# Patient Record
Sex: FEMALE | Race: BLACK OR AFRICAN AMERICAN | Employment: FULL TIME | URBAN - METROPOLITAN AREA
[De-identification: names, ages, dates, MRNs, and addresses within clinical notes are randomized per-mention and may not be internally consistent; named-entity substitution may affect disease eponyms.]

---

## 2017-01-03 ENCOUNTER — OFFICE VISIT (OUTPATIENT)
Dept: FAMILY MEDICINE CLINIC | Age: 57
End: 2017-01-03

## 2017-01-03 VITALS
HEIGHT: 64 IN | HEART RATE: 83 BPM | SYSTOLIC BLOOD PRESSURE: 152 MMHG | WEIGHT: 153 LBS | TEMPERATURE: 97.8 F | DIASTOLIC BLOOD PRESSURE: 95 MMHG | BODY MASS INDEX: 26.12 KG/M2 | RESPIRATION RATE: 16 BRPM

## 2017-01-03 DIAGNOSIS — I10 ESSENTIAL HYPERTENSION: ICD-10-CM

## 2017-01-03 DIAGNOSIS — G44.219 EPISODIC TENSION-TYPE HEADACHE, NOT INTRACTABLE: Primary | ICD-10-CM

## 2017-01-03 RX ORDER — MELOXICAM 15 MG/1
TABLET ORAL
Qty: 30 TAB | Refills: 0 | Status: SHIPPED | OUTPATIENT
Start: 2017-01-03 | End: 2017-04-11 | Stop reason: ALTCHOICE

## 2017-01-03 RX ORDER — HYDROCHLOROTHIAZIDE 25 MG/1
25 TABLET ORAL DAILY
Qty: 90 TAB | Refills: 1 | Status: SHIPPED | OUTPATIENT
Start: 2017-01-03 | End: 2017-07-12 | Stop reason: SDUPTHER

## 2017-01-03 NOTE — PROGRESS NOTES
Chief Complaint   Patient presents with    Hypertension     elevated intermediately x two months   Room 6

## 2017-01-03 NOTE — PROGRESS NOTES
Subjective:     Chief Complaint   Patient presents with    Hypertension     elevated intermediately x two months      She  is a 55y.o. year old female who presents for evaluation of:    HA x 2 months - no incr stress. Feels like crown around head. Worse over bilat temples. Not sleeping well. 1 cup coffee daily. No n/v, vision changes, Fhx of migraines. Lasts about 1-2 hours and better with NSAIDs. Hyperlipidemia & HTN  Pt is doing well on current meds with no medication side effects noted. Does not check BP at home. No new myalgias, no joint pains, no weakness  No TIA's, no chest pain on exertion, no dyspnea on exertion, no swelling of ankles.   Exercising - Minimal, walks at work  Dieting - No  Smoking - No     Lab Results   Component Value Date/Time    Cholesterol, total 185 09/07/2016 12:46 PM    HDL Cholesterol 80 09/07/2016 12:46 PM    LDL, calculated 97 09/07/2016 12:46 PM    Triglyceride 40 09/07/2016 12:46 PM     ROS  Gen - no fever/chills  Resp - no dyspnea or cough  CV - no chest pain or MONSIVAIS  Rest per HPI    Objective:     Vitals:    01/03/17 1603   BP: (!) 152/95   Pulse: 83   Resp: 16   Temp: 97.8 °F (36.6 °C)   TempSrc: Oral   Weight: 153 lb (69.4 kg)   Height: 5' 4\" (1.626 m)     Physical Examination:  General appearance - alert, well appearing, and in no distress  Eyes -sclera anicteric  Neck - supple, no significant adenopathy, no thyromegaly  Chest - clear to auscultation, no wheezes, rales or rhonchi, symmetric air entry  Heart - normal rate, regular rhythm, normal S1, S2, no murmurs, rubs, clicks or gallops  Neurological - alert, oriented, normal speech, no focal findings or movement disorder noted    Allergies   Allergen Reactions    Codeine Unknown (comments)     Simply avoids it      Social History     Social History    Marital status:      Spouse name: N/A    Number of children: N/A    Years of education: N/A     Social History Main Topics    Smoking status: Never Smoker    Smokeless tobacco: Never Used    Alcohol use No    Drug use: No    Sexual activity: Yes     Partners: Male     Birth control/ protection: Surgical     Other Topics Concern    None     Social History Narrative      Family History   Problem Relation Age of Onset    Heart Disease Mother     Hypertension Father     Hypertension Sister     Hypertension Brother       Past Surgical History   Procedure Laterality Date    Hx orthopaedic       tendon repair, hand    Endoscopy, colon, diagnostic  7/11     normal    Hx tubal ligation        Past Medical History   Diagnosis Date    DJD (degenerative joint disease) of lumbar spine 1/15/2015    Hypercholesterolemia     Hypertension     Osteoporosis     Prediabetes 9/15/2016    Viral meningitis       Current Outpatient Prescriptions   Medication Sig Dispense Refill    meloxicam (MOBIC) 15 mg tablet Take 1 tab by mouth daily with breakfast as needed for headaches 30 Tab 0    hydroCHLOROthiazide (HYDRODIURIL) 25 mg tablet Take 1 Tab by mouth daily. 90 Tab 1    PREMPRO 0.45-1.5 mg per tablet Take 1 Tab by mouth daily. 30 Tab 12    clobetasol (TEMOVATE) 0.05 % topical cream Apply  to affected area two (2) times a day for 180 days. 30 g 0        Assessment/ Plan:   Cruz Tolentino was seen today for hypertension. Diagnoses and all orders for this visit:    Episodic tension-type headache, not intractable - mild and responding to NSAIDs, work on stress managment  -     meloxicam (MOBIC) 15 mg tablet; Take 1 tab by mouth daily with breakfast as needed for headaches    Essential hypertension - bp too high so incr dose of HCTZ today  -     hydroCHLOROthiazide (HYDRODIURIL) 25 mg tablet; Take 1 Tab by mouth daily. I have discussed the diagnosis with the patient and the intended plan as seen in the above orders. The patient has received an after-visit summary and questions were answered concerning future plans.   I have discussed medication side effects and warnings with the patient as well. Follow-up Disposition:  Return in about 4 weeks (around 1/31/2017) for headaches.

## 2017-01-03 NOTE — MR AVS SNAPSHOT
Visit Information Date & Time Provider Department Dept. Phone Encounter #  
 1/3/2017  4:00 PM Yolette Garcia MD [facility] at [address] [id number] Follow-up Instructions Return in about 4 weeks (around 1/31/2017) for headaches. Your Appointments 2/24/2017  2:15 PM  
ROUTINE CARE with Antony Caban MD  
[facility]) Appt Note: well woman  
 [address] [phone] [address] [place] [address] [address] [address] Upcoming Health Maintenance Date Due  
 BREAST CANCER SCRN MAMMOGRAM 9/8/2018 PAP AKA CERVICAL CYTOLOGY 2/19/2019 COLONOSCOPY 7/22/2021 DTaP/Tdap/Td series (2 - Td) 9/7/2026 Allergies as of 1/3/2017  Review Complete On: 1/3/2017 By: Kei Marquez LPN Severity Noted Reaction Type Reactions Codeine  10/10/2010   Not Verified Unknown (comments) Simply avoids it Current Immunizations  Reviewed on 10/14/2011 Name Date Influenza Vaccine Split 10/5/2011 Not reviewed this visit You Were Diagnosed With   
  
 Codes Comments Episodic tension-type headache, not intractable    -  Primary ICD-10-CM: H41.185 ICD-9-CM: 339.11 Vitals BP Pulse Temp Resp Height(growth percentile) Weight(growth percentile) (!) 152/95 (BP 1 Location: Right arm, BP Patient Position: Sitting) 83 97.8 °F (36.6 °C) (Oral) 16 5' 4\" (1.626 m) 153 lb (69.4 kg) LMP BMI OB Status Smoking Status 11/24/2016 26.26 kg/m2 Postmenopausal Never Smoker Vitals History BMI and BSA Data Body Mass Index Body Surface Area  
 26.26 kg/m 2 1.77 m 2 Preferred Pharmacy Pharmacy Name Phone [facility]oni [facility] [phone] Your Updated Medication List  
  
   
This list is accurate as of: 1/3/17  5:11 PM.  Always use your most recent med list.  
  
  
  
  
 clobetasol 0.05 % topical cream  
Commonly known as:  Beola Jerod Apply  to affected area two (2) times a day for 180 days. hydroCHLOROthiazide 12.5 mg capsule Commonly known as:  Elmyra Glasgow TAKE 1 CAPSULE DAILY  
  
 meloxicam 15 mg tablet Commonly known as:  MOBIC Take 1 tab by mouth daily with breakfast as needed for headaches PREMPRO 0.45-1.5 mg per tablet Generic drug:  estrogen (conjugated)-medroxyPROGESTERone Take 1 Tab by mouth daily. Prescriptions Sent to Pharmacy Refills  
 meloxicam (MOBIC) 15 mg tablet 0 Sig: Take 1 tab by mouth daily with breakfast as needed for headaches Class: Normal  
 Pharmacy: [address], [address] [place] [address] #: [phone] Follow-up Instructions Return in about 4 weeks (around 1/31/2017) for headaches. Patient Instructions Tension Headache: Care Instructions Your Care Instructions Most headaches are tension headaches. These headaches tend to happen again, especially if you are under stress. A tension headache may cause pain or a feeling of pressure all over your head. You probably can't pinpoint the center of the pain. If you keep getting tension headaches, the best thing you can do to limit them is to find out what is causing them and then make changes in those areas. Follow-up care is a key part of your treatment and safety. Be sure to make and go to all appointments, and call your doctor if you are having problems. Its also a good idea to know your test results and keep a list of the medicines you take. How can you care for yourself at home? · Rest in a quiet, dark room with a cool cloth on your forehead until your headache is gone. Close your eyes, and try to relax or go to sleep. Don't watch TV or read. Avoid using the computer. · Use a warm, moist towel or a heating pad set on low to relax tight shoulder and neck muscles. · Have someone gently massage your neck and shoulders. · Take pain medicines exactly as directed. ¨ If the doctor gave you a prescription medicine for pain, take it as prescribed. ¨ If you are not taking a prescription pain medicine, ask your doctor if you can take an over-the-counter medicine. · Be careful not to take pain medicine more often than the instructions allow, because you may get worse or more frequent headaches when the medicine wears off. · If you get another tension headache, stop what you are doing and sit quietly for a moment. Close your eyes and breathe slowly. Try to relax your head and neck muscles. · Do not ignore new symptoms that occur with a headache, such as fever, weakness or numbness, vision changes, or confusion. These may be signs of a more serious problem. To help prevent headaches · Keep a headache diary so you can figure out what triggers your headaches. Avoiding triggers may help you prevent headaches. Record when each headache began, how long it lasted, and what the pain was like (throbbing, aching, stabbing, or dull). List anything that may have triggered the headache, such as being physically or emotionally stressed or being anxious or depressed. Other possible triggers are hunger, anger, fatigue, poor posture, and muscle strain. · Find healthy ways to deal with stress. Headaches are most common during or right after stressful times. Take time to relax before and after you do something that has caused a headache in the past. 
· Exercise daily to relieve stress. Relaxation exercises may help reduce tension. · Get plenty of sleep. · Eat regularly and well. Long periods without food can trigger a headache. · Treat yourself to a massage. Some people find that massages are very helpful in relieving tension. · Try to keep your muscles relaxed by keeping good posture. Check your jaw, face, neck, and shoulder muscles for tension, and try to relax them. When sitting at a desk, change positions often, and stretch for 30 seconds each hour. · Reduce eyestrain from computers by blinking frequently and looking away from the computer screen every so often. Make sure you have proper eyewear and that your monitor is set up properly, about an arms length away. When should you call for help? Call 911 anytime you think you may need emergency care. For example, call if: 
· You have signs of a stroke. These may include: 
¨ Sudden numbness, paralysis, or weakness in your face, arm, or leg, especially on only one side of your body. ¨ Sudden vision changes. ¨ Sudden trouble speaking. ¨ Sudden confusion or trouble understanding simple statements. ¨ Sudden problems with walking or balance. ¨ A sudden, severe headache that is different from past headaches. Call your doctor now or seek immediate medical care if: 
· You have new or worse nausea and vomiting. · You have a new or higher fever. · Your headache gets much worse. Watch closely for changes in your health, and be sure to contact your doctor if: 
· You are not getting better after 2 days (48 hours). Where can you learn more? Go to http://lachelle-bs.info/. Enter [phone] in the search box to learn more about \"Tension Headache: Care Instructions. \" Current as of: February 19, 2016 Content Version: 11.1 © 5455-8625 Healthwise, Incorporated. Care instructions adapted under license by [employer] (which disclaims liability or warranty for this information). If you have questions about a medical condition or this instruction, always ask your healthcare professional. [place] [address] any warranty or liability for your use of this information. Introducing [facility] & HEALTH SERVICES! Dear Red Leiva: Thank you for requesting a [employer] account. Our records indicate that you already have an active [employer] account.   You can access your account anytime at [email or web address] Did you know that you can access your hospital and ER discharge instructions at any time in [employer]? You can also review all of your test results from your hospital stay or ER visit. Additional Information If you have questions, please visit the Frequently Asked Questions section of the [employer] website at [email or web address] Remember, [employer] is NOT to be used for urgent needs. For medical emergencies, dial 911. Now available from your iPhone and Android! Please provide this summary of care documentation to your next provider. Your primary care clinician is listed as Blanco Argueta. If you have any questions after today's visit, please call [phone].

## 2017-01-26 DIAGNOSIS — M85.80 OSTEOPENIA: ICD-10-CM

## 2017-01-26 DIAGNOSIS — N95.1 HOT FLASHES, MENOPAUSAL: ICD-10-CM

## 2017-01-26 DIAGNOSIS — N95.9 MENOPAUSAL AND POSTMENOPAUSAL DISORDER: ICD-10-CM

## 2017-01-27 RX ORDER — CONJUGATED ESTROGENS AND MEDROXYPROGESTERONE ACETATE .45; 1.5 MG/1; MG/1
TABLET, SUGAR COATED ORAL
Qty: 84 TAB | Refills: 2 | Status: SHIPPED | OUTPATIENT
Start: 2017-01-27 | End: 2018-01-29 | Stop reason: SDUPTHER

## 2017-03-24 ENCOUNTER — OFFICE VISIT (OUTPATIENT)
Dept: SURGERY | Age: 57
End: 2017-03-24

## 2017-03-24 VITALS
WEIGHT: 154.4 LBS | TEMPERATURE: 97.4 F | BODY MASS INDEX: 26.36 KG/M2 | OXYGEN SATURATION: 97 % | SYSTOLIC BLOOD PRESSURE: 147 MMHG | HEART RATE: 77 BPM | DIASTOLIC BLOOD PRESSURE: 85 MMHG | HEIGHT: 64 IN | RESPIRATION RATE: 16 BRPM

## 2017-03-24 DIAGNOSIS — N95.0 PMB (POSTMENOPAUSAL BLEEDING): ICD-10-CM

## 2017-03-24 DIAGNOSIS — Z01.419 ENCOUNTER FOR GYNECOLOGICAL EXAMINATION WITHOUT ABNORMAL FINDING: Primary | ICD-10-CM

## 2017-03-24 DIAGNOSIS — M85.80 OSTEOPENIA: ICD-10-CM

## 2017-03-24 DIAGNOSIS — N95.9 MENOPAUSAL AND POSTMENOPAUSAL DISORDER: ICD-10-CM

## 2017-03-24 DIAGNOSIS — G47.20 DISRUPTIONS OF 24 HOUR SLEEP-WAKE CYCLE: ICD-10-CM

## 2017-03-24 DIAGNOSIS — B37.31 MONILIAL VAGINITIS: ICD-10-CM

## 2017-03-24 DIAGNOSIS — I10 ESSENTIAL HYPERTENSION: ICD-10-CM

## 2017-03-24 LAB — WET MOUNT POCT, WMPOCT: ABNORMAL

## 2017-03-24 RX ORDER — FLUCONAZOLE 150 MG/1
TABLET ORAL
Qty: 2 TAB | Refills: 1 | Status: SHIPPED | OUTPATIENT
Start: 2017-03-24 | End: 2017-04-11 | Stop reason: ALTCHOICE

## 2017-03-24 NOTE — MR AVS SNAPSHOT
Visit Information Date & Time Provider Department Dept. Phone Encounter #  
 3/24/2017  1:00 PM Ivan Arias, [address] [facility] [address] [id number] Follow-up Instructions Return in about 1 year (around 3/24/2018), or if symptoms worsen or fail to improve. Your Appointments 4/11/2017 10:10 AM  
ROUTINE CARE with Shala Panchal MD  
[facility] [address]) Appt Note: 6 mos f/u; r/s from 3-20-17  
 [facility] [address] [phone]  
[facility] Upcoming Health Maintenance Date Due  
 BREAST CANCER SCRN MAMMOGRAM 9/8/2018 PAP AKA CERVICAL CYTOLOGY 2/19/2019 COLONOSCOPY 7/22/2021 DTaP/Tdap/Td series (2 - Td) 9/7/2026 Allergies as of 3/24/2017  Review Complete On: 3/24/2017 By: Ivan Arias MD  
  
 Severity Noted Reaction Type Reactions Codeine  10/10/2010   Not Verified Unknown (comments) Simply avoids it Current Immunizations  Reviewed on 10/14/2011 Name Date Influenza Vaccine Split 10/5/2011 Not reviewed this visit You Were Diagnosed With   
  
 Codes Comments Encounter for gynecological examination without abnormal finding    -  Primary ICD-10-CM: C72.810 ICD-9-CM: V72.31   
 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 
ICD-9-CM: 401.9 Monilial vaginitis     ICD-10-CM: B37.3 ICD-9-CM: 112.1 Vitals BP Pulse Temp Resp Height(growth percentile) Weight(growth percentile) 147/85 77 97.4 °F (36.3 °C) (Oral) 16 5' 4\" (1.626 m) 154 lb 6.4 oz (70 kg) LMP SpO2 BMI OB Status Smoking Status 02/25/2017 97% 26.5 kg/m2 Postmenopausal Never Smoker Vitals History BMI and BSA Data Body Mass Index Body Surface Area  
 26.5 kg/m 2 1.78 m 2 Preferred Pharmacy Pharmacy Name Phone [facility] [phone] Your Updated Medication List  
  
   
This list is accurate as of: 3/24/17  2:17 PM.  Always use your most recent med list.  
  
  
  
  
 fluconazole 150 mg tablet Commonly known as:  DIFLUCAN Take 1st pill on day one then 2nd pill on day 3  
  
 hydroCHLOROthiazide 25 mg tablet Commonly known as:  HYDRODIURIL Take 1 Tab by mouth daily. meloxicam 15 mg tablet Commonly known as:  MOBIC Take 1 tab by mouth daily with breakfast as needed for headaches PREMPRO 0.45-1.5 mg per tablet Generic drug:  estrogen (conjugated)-medroxyPROGESTERone TAKE 1 TABLET DAILY Prescriptions Sent to Pharmacy Refills  
 fluconazole (DIFLUCAN) 150 mg tablet 1 Sig: Take 1st pill on day one then 2nd pill on day 3 Class: Normal  
 Pharmacy: Kranthi [facility]ures[facility] Ph #: [phone] We Performed the Following AMB POC SMEAR, STAIN & Charol Diss [facility] [id number] CPT(R)] Follow-up Instructions Return in about 1 year (around 3/24/2018), or if symptoms worsen or fail to improve. Introducing \[facility]\"" & HEALTH SERVICES! Dear Chrissie Jameson: Thank you for requesting a [employer] account. Our records indicate that you already have an active [employer] account. You can access your account anytime at [email or web address] Did you know that you can access your hospital and ER discharge instructions at any time in [employer]? You can also review all of your test results from your hospital stay or ER visit. Additional Information If you have questions, please visit the Frequently Asked Questions section of the [employer] website at [email or web address] Remember, Appetise is NOT to be used for urgent needs. For medical emergencies, dial 911. Now available from your iPhone and Android! Please provide this summary of care documentation to your next provider. Your primary care clinician is listed as Ayah Poll. If you have any questions after today's visit, please call [phone].

## 2017-03-24 NOTE — PROGRESS NOTES
SUBJECTIVE: Shannon Mckeon is a 55 y.o. female [id number] (Abortions 0, Miscarriages 0), who presents with desire for annual well woman exam. Patient's last menstrual period was 2017. Having severe hot flashes. Desires ERT. Allergies   Allergen Reactions    Codeine Unknown (comments)     Simply avoids it      Past Medical History:   Diagnosis Date    DJD (degenerative joint disease) of lumbar spine 1/15/2015    Hypercholesterolemia     Hypertension     Osteoporosis     Prediabetes 9/15/2016    Viral meningitis      Past Surgical History:   Procedure Laterality Date    ENDOSCOPY, COLON, DIAGNOSTIC      normal    HX ORTHOPAEDIC      tendon repair, hand    HX TUBAL LIGATION       OB History      Para Term  AB TAB SAB Ectopic Multiple Living    1 1                Obstetric Comments     Also 1 adopted child        Family History   Problem Relation Age of Onset    Heart Disease Mother     Hypertension Father     Hypertension Sister     Hypertension Brother      Social History     Social History    Marital status:      Spouse name: N/A    Number of children: N/A    Years of education: N/A     Occupational History    Not on file. Social History Main Topics    Smoking status: Never Smoker    Smokeless tobacco: Never Used    Alcohol use No    Drug use: No    Sexual activity: Yes     Partners: Male     Birth control/ protection: Surgical     Other Topics Concern    Not on file     Social History Narrative     Current Outpatient Prescriptions   Medication Sig Dispense Refill    PREMPRO 0.45-1.5 mg per tablet TAKE 1 TABLET DAILY 84 Tab 2    meloxicam (MOBIC) 15 mg tablet Take 1 tab by mouth daily with breakfast as needed for headaches 30 Tab 0    hydroCHLOROthiazide (HYDRODIURIL) 25 mg tablet Take 1 Tab by mouth daily. 90 Tab 1       Review of Systems:   Constitutional: No weight change, chills or fever, anorexia, weakness or sleep disturbance .  Cardiovascular: No chest pain, shortness of breath, or palpitations . Respiratory: No cough, shortness of breath, hemoptysis, or orthopnea . Neurologic: No syncope, headaches or seizures . Hematologic: No easy bruising or unusual bleeding . Psychiatric: No insomnia, confusion, depression, or anxiety . GI:No nausea and vomiting, diarrhea or constipation  . : See HPI . Musculoskeletal: No joint pain or muscle pain . Endocrine: No polydipsia, polyuria, cold intolerance, excessive fatigue, or sleep disturbance . Integumentary: No breast pain, lumps, nipple discharge, or axillary lumps . Objective:     Visit Vitals    /85    Pulse 77    Temp 97.4 °F (36.3 °C) (Oral)    Resp 16    Ht 5' 4\" (1.626 m)    Wt 154 lb 6.4 oz (70 kg)    LMP 02/25/2017    SpO2 97%    BMI 26.5 kg/m2       General:  alert, cooperative, no distress, appears stated age   Skin:  no rash or abnormalities   Eyes: negative   Mouth: MMM no lesions   Lymph Nodes:  Cervical, supraclavicular, and axillary nodes normal.   Breast Exam: normal appearance, no masses or tenderness    Lungs:  clear to auscultation bilaterally   Heart:  regular rate and rhythm   Abdomen: soft, non-tender. Bowel sounds normal. No masses,  no organomegaly   Back:  Costovertebral angle tenderness absent   Genitourinary: Pelvic exam: VULVA: normal appearing vulva with no masses, tenderness or lesions, VAGINA: cottage cheese like discharge, CERVIX: normal appearing cervix without discharge or lesions, UTERUS: uterus is normal size, shape, consistency and nontender, ADNEXA: normal adnexa in size, nontender and no masses. Extremities:  extremities normal, atraumatic, no cyanosis or edema   Neurologic:  sensation grossly intact. Psychiatric:  non focal     ASSESSMENT:      ICD-10-CM ICD-9-CM    1. Encounter for gynecological examination without abnormal finding Z01.419 V72.31    2. PMB (postmenopausal bleeding) N95.0 627.1    3.  Menopausal and postmenopausal disorder N95.9 627.9 4. 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 Osteopenia M85.80 733.90    6. Essential hypertension I10 401.9    7. Monilial vaginitis B37.3 112.1 AMB POC SMEAR, STAIN & INTERPRET, WET MOUNT      fluconazole (DIFLUCAN) 150 mg tablet        Follow-up Disposition:  Return in about 1 year (around 3/24/2018), or if symptoms worsen or fail to improve.

## 2017-04-11 ENCOUNTER — OFFICE VISIT (OUTPATIENT)
Dept: FAMILY MEDICINE CLINIC | Age: 57
End: 2017-04-11

## 2017-04-11 VITALS
RESPIRATION RATE: 16 BRPM | BODY MASS INDEX: 26.12 KG/M2 | SYSTOLIC BLOOD PRESSURE: 128 MMHG | HEIGHT: 64 IN | WEIGHT: 153 LBS | HEART RATE: 66 BPM | DIASTOLIC BLOOD PRESSURE: 77 MMHG | OXYGEN SATURATION: 96 % | TEMPERATURE: 97.1 F

## 2017-04-11 DIAGNOSIS — E78.00 HYPERCHOLESTEROLEMIA: ICD-10-CM

## 2017-04-11 DIAGNOSIS — Z79.899 ENCOUNTER FOR LONG-TERM (CURRENT) USE OF MEDICATIONS: ICD-10-CM

## 2017-04-11 DIAGNOSIS — I10 ESSENTIAL HYPERTENSION: Primary | ICD-10-CM

## 2017-04-11 RX ORDER — IBUPROFEN 200 MG
TABLET ORAL
COMMUNITY
End: 2018-03-30

## 2017-04-11 NOTE — MR AVS SNAPSHOT
Visit Information Date & Time Provider Department Dept. Phone Encounter #  
 4/11/2017 10:10 AM Aimee Carlson MD [facility] at [address] [id number] Follow-up Instructions Return in about 6 months (around 10/11/2017), or if symptoms worsen or fail to improve, for annual exam.  
  
Your Appointments 3/30/2018  1:00 PM  
ROUTINE CARE with Hollis Hill MD  
[facility] [address]) Appt Note: Well Woman $0CP SL 3.24.17  
 305 [facility]. [address] [phone] [address] [place] [address] [address] [address] Upcoming Health Maintenance Date Due  
 BREAST CANCER SCRN MAMMOGRAM 9/8/2018 PAP AKA CERVICAL CYTOLOGY 2/19/2019 COLONOSCOPY 7/22/2021 DTaP/Tdap/Td series (2 - Td) 9/7/2026 Allergies as of 4/11/2017  Review Complete On: 4/11/2017 By: Aimee Carlson MD  
  
 Severity Noted Reaction Type Reactions Codeine  10/10/2010   Not Verified Unknown (comments) Simply avoids it Current Immunizations  Reviewed on 10/14/2011 Name Date Influenza Vaccine Split 10/5/2011 Not reviewed this visit You Were Diagnosed With   
  
 Codes Comments Essential hypertension    -  Primary ICD-10-CM: I10 
ICD-9-CM: 401.9 Hypercholesterolemia     ICD-10-CM: E78.00 ICD-9-CM: 272.0 Encounter for long-term (current) use of medications     ICD-10-CM: Z79.899 ICD-9-CM: V58.69 Vitals BP Pulse Temp Resp Height(growth percentile) Weight(growth percentile) 128/77 (BP 1 Location: Right arm, BP Patient Position: Sitting) 66 97.1 °F (36.2 °C) (Oral) 16 5' 4\" (1.626 m) 153 lb (69.4 kg) LMP SpO2 BMI OB Status Smoking Status 02/25/2017 96% 26.26 kg/m2 Postmenopausal Never Smoker Vitals History BMI and BSA Data Body Mass Index Body Surface Area  
 26.26 kg/m 2 1.77 m 2 Preferred Pharmacy Pharmacy Name Phone [id number] [phone] Your Updated Medication List  
  
   
This list is accurate as of: 4/11/17 11:16 AM.  Always use your most recent med list.  
  
  
  
  
 hydroCHLOROthiazide 25 mg tablet Commonly known as:  HYDRODIURIL Take 1 Tab by mouth daily. ibuprofen 200 mg tablet Commonly known as:  MOTRIN Take  by mouth every six (6) hours as needed for Pain. PREMPRO 0.45-1.5 mg per tablet Generic drug:  estrogen (conjugated)-medroxyPROGESTERone TAKE 1 TABLET DAILY We Performed the Following LIPID PANEL [30064 CPT(R)] METABOLIC PANEL, COMPREHENSIVE [72130 CPT(R)] Follow-up Instructions Return in about 6 months (around 10/11/2017), or if symptoms worsen or fail to improve, for annual exam.  
  
  
Introducing [facility] & [facility] SERVICES! Dear Margi Osullivan: Thank you for requesting a Seclore account. Our records indicate that you already have an active Seclore account. You can access your account anytime at [email or web address] Did you know that you can access your hospital and ER discharge instructions at any time in Seclore? You can also review all of your test results from your hospital stay or ER visit. Additional Information If you have questions, please visit the Frequently Asked Questions section of the Seclore website at [email or web address] Remember, Seclore is NOT to be used for urgent needs. For medical emergencies, dial 911. Now available from your iPhone and Android! Please provide this summary of care documentation to your next provider. Your primary care clinician is listed as Evelia Shore. If you have any questions after today's visit, please call [phone].

## 2017-04-11 NOTE — PROGRESS NOTES
Subjective:     Chief Complaint   Patient presents with    Hypertension     6 month follow-up      She  is a 55y.o. year old female who presents for evaluation of:    Hyperlipidemia & HTN  Pt is doing well on current meds with no medication side effects noted. Does not check BP at home. No new myalgias, no joint pains, no weakness  No TIA's, no chest pain on exertion, no dyspnea on exertion, no swelling of ankles.   Exercising - Minimal, walks at work  Dieting - No  Smoking - No     Lab Results   Component Value Date/Time    Cholesterol, total 185 09/07/2016 12:46 PM    HDL Cholesterol 80 09/07/2016 12:46 PM    LDL, calculated 97 09/07/2016 12:46 PM    Triglyceride 40 09/07/2016 12:46 PM     ROS  Gen - no fever/chills  Resp - no dyspnea or cough  CV - no chest pain or MONSIVAIS   - has some postmenopausal bleeding and seeing Dr. Mily Coyne per HPI    Objective:     Vitals:    04/11/17 1046   BP: 128/77   Pulse: 66   Resp: 16   Temp: 97.1 °F (36.2 °C)   TempSrc: Oral   SpO2: 96%   Weight: 153 lb (69.4 kg)   Height: 5' 4\" (1.626 m)     Physical Examination:  General appearance - alert, well appearing, and in no distress  Eyes -sclera anicteric  Neck - supple, no significant adenopathy, no thyromegaly  Chest - clear to auscultation, no wheezes, rales or rhonchi, symmetric air entry  Heart - normal rate, regular rhythm, normal S1, S2, no murmurs, rubs, clicks or gallops  Neurological - alert, oriented, normal speech, no focal findings or movement disorder noted  Extr - no edema    Allergies   Allergen Reactions    Codeine Unknown (comments)     Simply avoids it      Social History     Social History    Marital status:      Spouse name: N/A    Number of children: N/A    Years of education: N/A     Social History Main Topics    Smoking status: Never Smoker    Smokeless tobacco: Never Used    Alcohol use No    Drug use: No    Sexual activity: Yes     Partners: Male     Birth control/ protection: Surgical Other Topics Concern    None     Social History Narrative      Family History   Problem Relation Age of Onset    Heart Disease Mother     Hypertension Father     Hypertension Sister     Hypertension Brother       Past Surgical History:   Procedure Laterality Date    ENDOSCOPY, COLON, DIAGNOSTIC  7/11    normal    HX ORTHOPAEDIC      tendon repair, hand    HX TUBAL LIGATION        Past Medical History:   Diagnosis Date    DJD (degenerative joint disease) of lumbar spine 1/15/2015    Hypercholesterolemia     Hypertension     Osteoporosis     Prediabetes 9/15/2016    Viral meningitis       Current Outpatient Prescriptions   Medication Sig Dispense Refill    ibuprofen (MOTRIN) 200 mg tablet Take  by mouth every six (6) hours as needed for Pain.  PREMPRO 0.45-1.5 mg per tablet TAKE 1 TABLET DAILY 84 Tab 2    hydroCHLOROthiazide (HYDRODIURIL) 25 mg tablet Take 1 Tab by mouth daily. 90 Tab 1        Assessment/ Plan:   Amanda Melchor was seen today for hypertension. Diagnoses and all orders for this visit:    Essential hypertension - well controlled  -     METABOLIC PANEL, COMPREHENSIVE    Hypercholesterolemia - well controlled  -     METABOLIC PANEL, COMPREHENSIVE  -     LIPID PANEL    Encounter for long-term (current) use of medications  -     METABOLIC PANEL, COMPREHENSIVE  -     LIPID PANEL    I have discussed the diagnosis with the patient and the intended plan as seen in the above orders. The patient has received an after-visit summary and questions were answered concerning future plans. I have discussed medication side effects and warnings with the patient as well.     Follow-up Disposition:  Return in about 6 months (around 10/11/2017), or if symptoms worsen or fail to improve, for annual exam.

## 2017-04-12 LAB
ALBUMIN SERPL-MCNC: 4.2 G/DL (ref 3.5–5.5)
ALBUMIN/GLOB SERPL: 1.4 {RATIO} (ref 1.2–2.2)
ALP SERPL-CCNC: 47 IU/L (ref 39–117)
ALT SERPL-CCNC: 7 IU/L (ref 0–32)
AST SERPL-CCNC: 17 IU/L (ref 0–40)
BILIRUB SERPL-MCNC: 0.6 MG/DL (ref 0–1.2)
BUN SERPL-MCNC: 15 MG/DL (ref 6–24)
BUN/CREAT SERPL: 22 (ref 9–23)
CALCIUM SERPL-MCNC: 9.2 MG/DL (ref 8.7–10.2)
CHLORIDE SERPL-SCNC: 101 MMOL/L (ref 96–106)
CHOLEST SERPL-MCNC: 181 MG/DL (ref 100–199)
CO2 SERPL-SCNC: 25 MMOL/L (ref 18–29)
CREAT SERPL-MCNC: 0.69 MG/DL (ref 0.57–1)
GLOBULIN SER CALC-MCNC: 3.1 G/DL (ref 1.5–4.5)
GLUCOSE SERPL-MCNC: 84 MG/DL (ref 65–99)
HDLC SERPL-MCNC: 76 MG/DL
LDLC SERPL CALC-MCNC: 97 MG/DL (ref 0–99)
POTASSIUM SERPL-SCNC: 3.9 MMOL/L (ref 3.5–5.2)
PROT SERPL-MCNC: 7.3 G/DL (ref 6–8.5)
SODIUM SERPL-SCNC: 145 MMOL/L (ref 134–144)
TRIGL SERPL-MCNC: 40 MG/DL (ref 0–149)
VLDLC SERPL CALC-MCNC: 8 MG/DL (ref 5–40)

## 2017-07-12 DIAGNOSIS — I10 ESSENTIAL HYPERTENSION: ICD-10-CM

## 2017-07-13 RX ORDER — HYDROCHLOROTHIAZIDE 25 MG/1
TABLET ORAL
Qty: 90 TAB | Refills: 0 | Status: SHIPPED | OUTPATIENT
Start: 2017-07-13 | End: 2017-10-12 | Stop reason: SDUPTHER

## 2017-08-09 ENCOUNTER — TELEPHONE (OUTPATIENT)
Dept: SURGERY | Age: 57
End: 2017-08-09

## 2017-08-09 NOTE — TELEPHONE ENCOUNTER
Called pt and she is having some PMB. Bleeding twice per month. Told to call office and make appt ASAP.

## 2017-08-29 ENCOUNTER — OFFICE VISIT (OUTPATIENT)
Dept: SURGERY | Age: 57
End: 2017-08-29

## 2017-08-29 VITALS
OXYGEN SATURATION: 97 % | BODY MASS INDEX: 26.67 KG/M2 | TEMPERATURE: 97.3 F | HEIGHT: 64 IN | DIASTOLIC BLOOD PRESSURE: 93 MMHG | RESPIRATION RATE: 18 BRPM | SYSTOLIC BLOOD PRESSURE: 136 MMHG | HEART RATE: 86 BPM | WEIGHT: 156.2 LBS

## 2017-08-29 DIAGNOSIS — Z92.89 HISTORY OF ENDOMETRIAL BIOPSY: ICD-10-CM

## 2017-08-29 DIAGNOSIS — N95.0 POSTMENOPAUSAL BLEEDING: Primary | ICD-10-CM

## 2017-08-29 DIAGNOSIS — N95.9 MENOPAUSAL AND POSTMENOPAUSAL DISORDER: ICD-10-CM

## 2017-08-29 NOTE — MR AVS SNAPSHOT
Visit Information Date & Time Provider Department Dept. Phone Encounter #  
 8/29/2017  2:45 PM Eduardo Hernández, [address] [facility] [phone] [id number] Follow-up Instructions Return in about 2 weeks (around 9/12/2017), or if symptoms worsen or fail to improve. Your Appointments 10/12/2017  8:30 AM  
ROUTINE CARE with Filomena Olguin MD  
[facility]) Appt Note: 6m fu  
 [facility] [address] [address]  
[facility]  
  
    
 3/30/2018  1:00 PM  
ROUTINE CARE with Eduardo Hernández MD  
[facility]) Appt Note: Well Woman $0CP SL 3.24.17  
 305 [facility]. [address] [address] [address] [facility] [address] Upcoming Health Maintenance Date Due INFLUENZA AGE 9 TO ADULT 8/1/2017 BREAST CANCER SCRN MAMMOGRAM 9/8/2018 PAP AKA CERVICAL CYTOLOGY 2/19/2019 COLONOSCOPY 7/22/2021 DTaP/Tdap/Td series (2 - Td) 9/7/2026 Allergies as of 8/29/2017  Review Complete On: 8/29/2017 By: Eduardo Hernández MD  
  
 Severity Noted Reaction Type Reactions Codeine  10/10/2010   Not Verified Unknown (comments) Simply avoids it Current Immunizations  Reviewed on 10/14/2011 Name Date Influenza Vaccine Split 10/5/2011 Not reviewed this visit You Were Diagnosed With   
  
 Codes Comments Postmenopausal bleeding    -  Primary ICD-10-CM: N95.0 ICD-9-CM: 627.1 Menopausal and postmenopausal disorder     ICD-10-CM: N95.9 ICD-9-CM: 627.9 History of endometrial biopsy     ICD-10-CM: Z78.9 ICD-9-CM: V49.89 Vitals BP Pulse Temp Resp Height(growth percentile) Weight(growth percentile)  (!) 136/93 (BP 1 Location: Right arm, BP Patient Position: Sitting) 86 97.3 °F (36.3 °C) (Temporal) 18 5' 4\" (1.626 m) 156 lb 3.2 oz (70.9 kg) LMP SpO2 BMI OB Status Smoking Status 08/08/2017 (Approximate) 97% 26.81 kg/m2 Postmenopausal Never Smoker BMI and BSA Data Body Mass Index Body Surface Area  
 26.81 kg/m 2 1.79 m 2 Preferred Pharmacy Pharmacy Name Phone [address] [facility] [phone] Your Updated Medication List  
  
   
This list is accurate as of: 8/29/17  3:21 PM.  Always use your most recent med list.  
  
  
  
  
 hydroCHLOROthiazide 25 mg tablet Commonly known as:  HYDRODIURIL  
TAKE 1 TABLET DAILY  
  
 ibuprofen 200 mg tablet Commonly known as:  MOTRIN Take  by mouth every six (6) hours as needed for Pain. PREMPRO 0.45-1.5 mg per tablet Generic drug:  estrogen (conjugated)-medroxyPROGESTERone TAKE 1 TABLET DAILY Follow-up Instructions Return in about 2 weeks (around 9/12/2017), or if symptoms worsen or fail to improve. To-Do List   
 08/29/2017 Imaging:  US PELV NON OBS   
  
 08/29/2017 Imaging:  US TRANSVAGINAL   
  
 09/20/2017 1:40 PM  
  Appointment with [address] ROSINA 1 at [facility] ([phone]) Shower or bathe using soap and water. Do not use deodorant, powder, perfumes, or lotion the day of your exam.  If your prior mammograms were not performed at [facility] 6 please bring films with you or forward prior images 2 days before your procedure. Check in at registration 15min before your appointment time unless you were instructed to do otherwise. A script is not necessary, but if you have one, please bring it on the day of the mammogram or have it faxed to the department. [facility] [phone] [facility]  [phone] [facility] [address] [place]  [phone] [phone] [facility] 115[address] [facility] [phone] [facility] & [facility] SERVICES! Dear Kristine Manus: Thank you for requesting a [employer] account. Our records indicate that you already have an active [employer] account. You can access your account anytime at [email or web address] Did you know that you can access your hospital and ER discharge instructions at any time in [employer]? You can also review all of your test results from your hospital stay or ER visit. Additional Information If you have questions, please visit the Frequently Asked Questions section of the [employer] website at [email or web address] Remember, Lif[employer] is NOT to be used for urgent needs. For medical emergencies, dial 911. Now available from your iPhone and Android! Please provide this summary of care documentation to your next provider. Your primary care clinician is listed as Arvind Zaman. If you have any questions after today's visit, please call [phone].

## 2017-08-29 NOTE — PROGRESS NOTES
SUBJECTIVE: Stanley Bates is a 65 y.o. female who presents with multiple cycles 7/22, 8/8: heavy bleeding, both lasted around 7 days, no pain or cramping. Severity is described as moderate. Patient's last menstrual period was 08/08/2017 (approximate). On prempro. 12/02/2017  FINAL PATHOLOGIC DIAGNOSIS   Endometrial lining, biopsy:   Fragments of benign inactive endometrium     Diagnosis:   Part A: CERVIX, POLYP:   BENIGN ENDOCERVICAL POLYP WITH ACUTE INFLAMMATION AND SQUAMOUS   METAPLASIA. Part B: ENDOMETRIUM, BIOPSY:   FRAGMENTS OF PROLIFERATIVE ENDOMETRIUM WITH BREAKDOWN CHANGES,   CONSISTENT WITH ANOVULATORY CYCLE. NO HYPERPLASIA OR CARCINOMA. SOL/04/26/2016     Allergies   Allergen Reactions    Codeine Unknown (comments)     Simply avoids it       ROS: Constitutional: No weight change, chills or fever, anorexia, weakness or sleep disturbance . Cardiovascular: No chest pain, shortness of breath, or palpitations . Respiratory: No cough, shortness of breath, hemoptysis, or orthopnea . Neurologic: No syncope, headaches or seizures . Hematologic: No easy bruising or unusual bleeding . Psychiatric: No insomnia, confusion, depression, or anxiety . GI:No nausea and vomiting, diarrhea or constipation  . : See HPI . Musculoskeletal: No joint pain or muscle pain . Endocrine: No polydipsia, polyuria, cold intolerance, excessive fatigue, or sleep disturbance . Integumentary: No breast pain, lumps, nipple discharge, or axillary lumps . OBJECTIVE:     Visit Vitals    BP (!) 136/93 (BP 1 Location: Right arm, BP Patient Position: Sitting)    Pulse 86    Temp 97.3 °F (36.3 °C) (Temporal)    Resp 18    Ht 5' 4\" (1.626 m)    Wt 156 lb 3.2 oz (70.9 kg)    LMP 08/08/2017 (Approximate)    SpO2 97%    BMI 26.81 kg/m2       General:  alert, cooperative, no distress, appears stated age   Abdomen: soft, non-tender.  Bowel sounds normal. No masses,  no organomegaly   Back:  Costovertebral angle tenderness absent   Genitourinary: Pelvic exam: VULVA: normal appearing vulva with no masses, tenderness or lesions, VAGINA: normal appearing vagina with normal color and discharge, no lesions, CERVIX: normal appearing cervix without discharge or lesions, UTERUS: uterus is normal size, shape, consistency and nontender, ADNEXA: normal adnexa in size, nontender and no masses. Extremities:  extremities normal, atraumatic, no cyanosis or edema     ASSESSMENT:      ICD-10-CM ICD-9-CM    1. Postmenopausal bleeding N95.0 627.1 US PELV NON OBS      US TRANSVAGINAL   2. Menopausal and postmenopausal disorder N95.9 627.9    3. 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  PREMPRO 0.45-1.5 mg per tablet TAKE 1 TABLET DAILY 84 Tab 2        Follow-up Disposition:  Return in about 2 weeks (around 9/12/2017), or if symptoms worsen or fail to improve.

## 2017-08-29 NOTE — PROGRESS NOTES
Yesy Espinoza is a 65 y.o. female   Chief Complaint   Patient presents with    Vaginal Bleeding     multiple cycles 7/22, 8/8: heavy bleeding, both lasted around 7 days, no pain or cramping     1. Have you been to an emergency room, urgent clinic, or hospitalized since your last visit? NO  If yes, where when, and reason for visit? 2. Have seen or consulted any other health care provider since your last visit? NO  Please include any pap smears or colon screening in this section  If yes, where when, and reason for visit? 6. Do you have an Advanced Directive/ Living Will in place?  NO  If yes, do we have a copy on file NO  If no, would you like information NO

## 2017-09-05 ENCOUNTER — HOSPITAL ENCOUNTER (OUTPATIENT)
Dept: ULTRASOUND IMAGING | Age: 57
Discharge: HOME | End: 2017-09-05
Attending: OBSTETRICS & GYNECOLOGY
Payer: COMMERCIAL

## 2017-09-05 DIAGNOSIS — N95.0 POSTMENOPAUSAL BLEEDING: ICD-10-CM

## 2017-09-05 PROCEDURE — 76830 TRANSVAGINAL US NON-OB: CPT

## 2017-09-05 PROCEDURE — 76856 US EXAM PELVIC COMPLETE: CPT

## 2017-09-15 ENCOUNTER — OFFICE VISIT (OUTPATIENT)
Dept: SURGERY | Age: 57
End: 2017-09-15

## 2017-09-15 VITALS
WEIGHT: 155.6 LBS | TEMPERATURE: 97 F | OXYGEN SATURATION: 96 % | DIASTOLIC BLOOD PRESSURE: 82 MMHG | HEIGHT: 64 IN | BODY MASS INDEX: 26.56 KG/M2 | HEART RATE: 85 BPM | SYSTOLIC BLOOD PRESSURE: 127 MMHG

## 2017-09-15 DIAGNOSIS — N95.9 MENOPAUSAL AND POSTMENOPAUSAL DISORDER: ICD-10-CM

## 2017-09-15 DIAGNOSIS — Z92.89 HISTORY OF ENDOMETRIAL BIOPSY: ICD-10-CM

## 2017-09-15 DIAGNOSIS — D25.1 INTRAMURAL LEIOMYOMA OF UTERUS: Primary | ICD-10-CM

## 2017-09-15 NOTE — MR AVS SNAPSHOT
Visit Information Date & Time Provider Department Dept. Phone Encounter #  
 9/15/2017  2:45 PM Eduardo Hernández, [address] [facility] [address] [id number] Follow-up Instructions Return in about 6 months (around 3/15/2018), or if symptoms worsen or fail to improve. Your Appointments 10/12/2017  8:30 AM  
ROUTINE CARE with Filomena Olguin MD  
[facility]) Appt Note: 6m fu  
 [facility] [address] [place] [address]  
[facility]  
  
    
 3/30/2018  1:00 PM  
ROUTINE CARE with Eduardo Hernández MD  
[facility] [place] [facility]) Appt Note: Well Woman $0CP SL 3.24.17  
 [address] [facility]. [address] [place] [phone] [address] [place] [address] [address] [place] [address] Upcoming Health Maintenance Date Due INFLUENZA AGE 9 TO ADULT 8/1/2017 BREAST CANCER SCRN MAMMOGRAM 9/8/2018 PAP AKA CERVICAL CYTOLOGY 2/19/2019 COLONOSCOPY 7/22/2021 DTaP/Tdap/Td series (2 - Td) 9/7/2026 Allergies as of 9/15/2017  Review Complete On: 9/15/2017 By: Eduardo Hernández MD  
  
 Severity Noted Reaction Type Reactions Codeine  10/10/2010   Not Verified Unknown (comments) Simply avoids it Current Immunizations  Reviewed on 10/14/2011 Name Date Influenza Vaccine Split 10/5/2011 Not reviewed this visit You Were Diagnosed With   
  
 Codes Comments Intramural leiomyoma of uterus    -  Primary ICD-10-CM: D25.1 ICD-9-CM: 218.1 History of endometrial biopsy     ICD-10-CM: Z78.9 ICD-9-CM: V49.89 Menopausal and postmenopausal disorder     ICD-10-CM: N95.9 ICD-9-CM: 627.9 Vitals BP Pulse Temp Height(growth percentile) Weight(growth percentile) LMP  
 127/82 85 97 °F (36.1 °C) (Oral) 5' 4\" (1.626 m) 155 lb 9.6 oz (70.6 kg) 08/08/2017 (Approximate) SpO2 BMI OB Status Smoking Status 96% 26.71 kg/m2 Postmenopausal Never Smoker Vitals History BMI and BSA Data Body Mass Index Body Surface Area  
 26.71 kg/m 2 1.79 m 2 Preferred Pharmacy Pharmacy Name Phone [phone] [phone] Your Updated Medication List  
  
   
This list is accurate as of: 9/15/17  3:05 PM.  Always use your most recent med list.  
  
  
  
  
 hydroCHLOROthiazide 25 mg tablet Commonly known as:  HYDRODIURIL  
TAKE 1 TABLET DAILY  
  
 ibuprofen 200 mg tablet Commonly known as:  MOTRIN Take  by mouth every six (6) hours as needed for Pain. PREMPRO 0.45-1.5 mg per tablet Generic drug:  estrogen (conjugated)-medroxyPROGESTERone TAKE 1 TABLET DAILY Follow-up Instructions Return in about 6 months (around 3/15/2018), or if symptoms worsen or fail to improve. To-Do List   
 09/20/2017 1:40 PM  
  Appointment with [facility] ROSINA 1 at [facility] ([phone]) Shower or bathe using soap and water. Do not use deodorant, powder, perfumes, or lotion the day of your exam.  If your prior mammograms were not performed at [facility] 6 please bring films with you or forward prior images 2 days before your procedure. Check in at registration 15min before your appointment time unless you were instructed to do otherwise. A script is not necessary, but if you have one, please bring it on the day of the mammogram or have it faxed to the department. [facility] [phone] [facility] [address]  [phone] [facility] [address] [place]  [phone] [facility] [phone] [facility] [address] [facility] [phone] Introducing [address]! Dear Citlalli Mendez: Thank you for requesting a [employer] account. Our records indicate that you already have an active [employer] account. You can access your account anytime at [email or web address] Did you know that you can access your hospital and ER discharge instructions at any time in [employer]? You can also review all of your test results from your hospital stay or ER visit. Additional Information If you have questions, please visit the Frequently Asked Questions section of the [employer] website at [email or web address] Remember, [employer] is NOT to be used for urgent needs. For medical emergencies, dial 911. Now available from your iPhone and Android! Please provide this summary of care documentation to your next provider. Your primary care clinician is listed as Los Walker. If you have any questions after today's visit, please call [phone].

## 2017-09-15 NOTE — PROGRESS NOTES
SUBJECTIVE: Magda Das is a 65 y.o. female who presents with multiple cycles 7/22, 8/8: heavy bleeding, both lasted around 7 days, no pain or cramping. Severity is described as moderate. Patient's last menstrual period was 08/08/2017 (approximate). Pt states that she has not had anymore vaginal bleeding since 08/08/2017. No pain. On prempro. 12/02/2017  FINAL PATHOLOGIC DIAGNOSIS   Endometrial lining, biopsy:   Fragments of benign inactive endometrium       04/26/2016   Diagnosis:   Part A: CERVIX, POLYP:   BENIGN ENDOCERVICAL POLYP WITH ACUTE INFLAMMATION AND SQUAMOUS   METAPLASIA. Part B: ENDOMETRIUM, BIOPSY:   FRAGMENTS OF PROLIFERATIVE ENDOMETRIUM WITH BREAKDOWN CHANGES,   CONSISTENT WITH ANOVULATORY CYCLE. NO HYPERPLASIA OR CARCINOMA. SOL/04/26/2016     Allergies   Allergen Reactions    Codeine Unknown (comments)     Simply avoids it       ROS: Constitutional: No weight change, chills or fever, anorexia, weakness or sleep disturbance . Cardiovascular: No chest pain, shortness of breath, or palpitations . Respiratory: No cough, shortness of breath, hemoptysis, or orthopnea . Neurologic: No syncope, headaches or seizures . Hematologic: No easy bruising or unusual bleeding . Psychiatric: No insomnia, confusion, depression, or anxiety . GI:No nausea and vomiting, diarrhea or constipation  . : See HPI . Musculoskeletal: No joint pain or muscle pain . Endocrine: No polydipsia, polyuria, cold intolerance, excessive fatigue, or sleep disturbance . Integumentary: No breast pain, lumps, nipple discharge, or axillary lumps . OBJECTIVE:     Visit Vitals    /82    Pulse 85    Temp 97 °F (36.1 °C) (Oral)    Ht 5' 4\" (1.626 m)    Wt 155 lb 9.6 oz (70.6 kg)    LMP 08/08/2017 (Approximate)    SpO2 96%    BMI 26.71 kg/m2       General:  alert, cooperative, no distress, appears stated age   Abdomen: soft, non-tender.  Bowel sounds normal. No masses,  no organomegaly   Back: Costovertebral angle tenderness absent   Genitourinary: Pelvic exam: VULVA: normal appearing vulva with no masses, tenderness or lesions, VAGINA: normal appearing vagina with normal color and discharge, no lesions, CERVIX: normal appearing cervix without discharge or lesions, UTERUS: uterus is normal size, shape, consistency and nontender, ADNEXA: normal adnexa in size, nontender and no masses. Extremities:  extremities normal, atraumatic, no cyanosis or edema       Final result (Exam End: 9/5/2017  3:47 PM) Reviewed    Study Result   Clinical indication: Postmenopausal bleeding.     Transabdominal and transvaginal pelvic sonography performed.     The uterus measured 8 x 4 x 4.6 cm. Several fibroids are demonstrated. One is  pedunculated on the left and measure 4.3 x 3.4 cm, one is subserosal on the  right and measure 4.7 x 4.3 x 3.6 cm. The stripe is 5 mm, the cervix appears  normal. There is no adnexal mass or free fluid. The ovaries are not visualized  due to body habitus and gas.     IMPRESSION   impression: Uterine fibroids. ASSESSMENT:      ICD-10-CM ICD-9-CM    1. Intramural leiomyoma of uterus D25.1 218.1    2. History of endometrial biopsy Z78.9 V49.89    3.  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  PREMPRO 0.45-1.5 mg per tablet TAKE 1 TABLET DAILY 84 Tab 2     Will repeat pelvic US in 6 months for f/u. She does not want surgery at this time. Follow-up Disposition:  Return in about 6 months (around 3/15/2018), or if symptoms worsen or fail to improve.

## 2017-10-10 ENCOUNTER — HOSPITAL ENCOUNTER (OUTPATIENT)
Dept: MAMMOGRAPHY | Age: 57
Discharge: HOME | End: 2017-10-10
Attending: OBSTETRICS & GYNECOLOGY
Payer: COMMERCIAL

## 2017-10-10 DIAGNOSIS — Z12.31 VISIT FOR SCREENING MAMMOGRAM: ICD-10-CM

## 2017-10-10 PROCEDURE — 77067 SCR MAMMO BI INCL CAD: CPT

## 2017-10-12 ENCOUNTER — OFFICE VISIT (OUTPATIENT)
Dept: FAMILY MEDICINE CLINIC | Age: 57
End: 2017-10-12

## 2017-10-12 VITALS
DIASTOLIC BLOOD PRESSURE: 76 MMHG | SYSTOLIC BLOOD PRESSURE: 138 MMHG | HEART RATE: 72 BPM | WEIGHT: 156 LBS | OXYGEN SATURATION: 98 % | BODY MASS INDEX: 26.63 KG/M2 | TEMPERATURE: 97.2 F | RESPIRATION RATE: 16 BRPM | HEIGHT: 64 IN

## 2017-10-12 DIAGNOSIS — E78.00 HYPERCHOLESTEROLEMIA: ICD-10-CM

## 2017-10-12 DIAGNOSIS — M85.80 OSTEOPENIA, UNSPECIFIED LOCATION: ICD-10-CM

## 2017-10-12 DIAGNOSIS — Z00.00 WELL ADULT EXAM: Primary | ICD-10-CM

## 2017-10-12 DIAGNOSIS — I10 ESSENTIAL HYPERTENSION: ICD-10-CM

## 2017-10-12 DIAGNOSIS — E55.9 VITAMIN D DEFICIENCY: ICD-10-CM

## 2017-10-12 DIAGNOSIS — R73.03 PREDIABETES: ICD-10-CM

## 2017-10-12 LAB
BILIRUB UR QL STRIP: NEGATIVE
GLUCOSE UR-MCNC: NEGATIVE MG/DL
KETONES P FAST UR STRIP-MCNC: NEGATIVE MG/DL
PH UR STRIP: 7.5 [PH] (ref 4.6–8)
PROT UR QL STRIP: NEGATIVE MG/DL
SP GR UR STRIP: 1.01 (ref 1–1.03)
UA UROBILINOGEN AMB POC: NORMAL (ref 0.2–1)
URINALYSIS CLARITY POC: CLEAR
URINALYSIS COLOR POC: YELLOW
URINE BLOOD POC: NEGATIVE
URINE LEUKOCYTES POC: NEGATIVE
URINE NITRITES POC: NEGATIVE

## 2017-10-12 RX ORDER — HYDROCHLOROTHIAZIDE 25 MG/1
TABLET ORAL
Qty: 90 TAB | Refills: 1 | Status: SHIPPED | OUTPATIENT
Start: 2017-10-12 | End: 2018-04-23 | Stop reason: SDUPTHER

## 2017-10-12 NOTE — PROGRESS NOTES
Subjective:     Chief Complaint   Patient presents with    Hypertension     6 month follow-up      She  is a 65y.o. year old female who presents for evaluation of: CPE  UTD on colonoscopy and paps. mammo scheduled tomorrow. Seeing Dr. Damien Mishra for OB/GYN. Hyperlipidemia & HTN  Pt is doing well on current meds with no medication side effects noted. Does not check BP at home. No new myalgias, no joint pains, no weakness  No TIA's, no chest pain on exertion, no dyspnea on exertion, no swelling of ankles.   Exercising - Minimal, walks at work  Dieting - No  Smoking - No     Lab Results   Component Value Date/Time    Cholesterol, total 181 04/11/2017 11:29 AM    HDL Cholesterol 76 04/11/2017 11:29 AM    LDL, calculated 97 04/11/2017 11:29 AM    Triglyceride 40 04/11/2017 11:29 AM     ROS:  Constitutional: negative except for fevers, chills and fatigue  Eyes: negative for visual disturbance  Ears, nose, mouth, throat, and face: negative for hearing loss and sore throat  Respiratory: negative for cough or dyspnea on exertion  Cardiovascular: negative for chest pain, dyspnea, palpitations, fatigue  Gastrointestinal: negative for nausea, vomiting, change in bowel habits, diarrhea and abdominal pain  Genitourinary:negative for frequency and dysuria  Skinno rashes  Hematologic/lymphatic: negative for easy bruising and bleeding  Musculoskeletal:negative for myalgias and muscle weakness  Neurological: negative for headaches and dizziness  Behavioral/Psych: negative for anxiety and depression    Objective:     Vitals:    10/12/17 0849   BP: 138/76   Pulse: 72   Resp: 16   Temp: 97.2 °F (36.2 °C)   TempSrc: Oral   SpO2: 98%   Weight: 156 lb (70.8 kg)   Height: 5' 4\" (1.626 m)     Physical Examination:  General appearance - alert, well appearing, and in no distress  Eyes - sclera anicteric, extraocular eye movements intact  Nose - normal and patent, no erythema, discharge or polyps  Mouth - mucous membranes moist, pharynx normal without lesions  Neck - supple, no significant adenopathy  Lymphatics - no palpable lymphadenopathy, no hepatosplenomegaly  Chest - clear to auscultation, no wheezes, rales or rhonchi, symmetric air entry  Heart - normal rate, regular rhythm, normal S1, S2, no murmurs, rubs, clicks or gallops  Abdomen - soft, nontender, nondistended, no masses or organomegaly  Breasts & Pelvic - exam declined by the patient as done by OB/GYN  Neurological - alert, oriented, normal speech, no focal findings or movement disorder noted  Musculoskeletal - no joint tenderness, deformity or swelling  Extremities -no edema    Allergies   Allergen Reactions    Codeine Unknown (comments)     Simply avoids it      Social History     Social History    Marital status:      Spouse name: N/A    Number of children: N/A    Years of education: N/A     Social History Main Topics    Smoking status: Never Smoker    Smokeless tobacco: Never Used    Alcohol use No    Drug use: No    Sexual activity: Yes     Partners: Male     Birth control/ protection: Surgical     Other Topics Concern    None     Social History Narrative      Family History   Problem Relation Age of Onset    Heart Disease Mother     Hypertension Father     Hypertension Sister     Hypertension Brother       Past Surgical History:   Procedure Laterality Date    ENDOSCOPY, COLON, DIAGNOSTIC  7/11    normal    HX ORTHOPAEDIC      tendon repair, hand    HX TUBAL LIGATION        Past Medical History:   Diagnosis Date    DJD (degenerative joint disease) of lumbar spine 1/15/2015    Hypercholesterolemia     Hypertension     Osteoporosis     Prediabetes 9/15/2016    Viral meningitis       Current Outpatient Prescriptions   Medication Sig Dispense Refill    hydroCHLOROthiazide (HYDRODIURIL) 25 mg tablet TAKE 1 TABLET DAILY 90 Tab 1    ibuprofen (MOTRIN) 200 mg tablet Take  by mouth every six (6) hours as needed for Pain.       PREMPRO 0.45-1.5 mg per tablet TAKE 1 TABLET DAILY 84 Tab 2        Assessment/ Plan:   Diagnoses and all orders for this visit:    1. Well adult exam  -     CBC W/O DIFF  -     LIPID PANEL  -     HEMOGLOBIN A1C WITH EAG  -     METABOLIC PANEL, COMPREHENSIVE  -     TSH 3RD GENERATION  -     AMB POC URINALYSIS DIP STICK AUTO W/O MICRO  -     VITAMIN D, 25 HYDROXY    2. Essential hypertensionwell controlled  -     hydroCHLOROthiazide (HYDRODIURIL) 25 mg tablet; TAKE 1 TABLET DAILY  -     METABOLIC PANEL, COMPREHENSIVE  -     AMB POC URINALYSIS DIP STICK AUTO W/O MICRO    3. Hypercholesterolemiawell controlled  -     LIPID PANEL  -     HEMOGLOBIN A1C WITH EAG  -     METABOLIC PANEL, COMPREHENSIVE  -     TSH 3RD GENERATION    4. Prediabetesstable  -     HEMOGLOBIN A1C WITH EAG    5. Vitamin D deficiency  -     VITAMIN D, 25 HYDROXY    6. Osteopenia, unspecified location    I have discussed the diagnosis with the patient and the intended plan as seen in the above orders. The patient has received an after-visit summary and questions were answered concerning future plans. I have discussed medication side effects and warnings with the patient as well. Follow-up Disposition:  Return in about 6 months (around 4/12/2018), or if symptoms worsen or fail to improve.

## 2017-10-12 NOTE — MR AVS SNAPSHOT
Visit Information Date & Time Provider Department Dept. Phone Encounter #  
 10/12/2017  8:30 AM Preeti Beverly MD [facility] at [address] [id number] Follow-up Instructions Return in about 6 months (around 4/12/2018), or if symptoms worsen or fail to improve. Your Appointments 3/30/2018  1:00 PM  
ROUTINE CARE with Arleen Carlson MD  
[facility] [address]) Appt Note: Well Woman $0CP SL 3.24.17  
 [address] [facility]. [address] [id number] [address] [place] [address] [address] [address] Upcoming Health Maintenance Date Due  
 PAP AKA CERVICAL CYTOLOGY 2/19/2019 BREAST CANCER SCRN MAMMOGRAM 10/10/2019 COLONOSCOPY 7/22/2021 DTaP/Tdap/Td series (2 - Td) 9/7/2026 Allergies as of 10/12/2017  Review Complete On: 10/12/2017 By: Preeti Beverly MD  
  
 Severity Noted Reaction Type Reactions Codeine  10/10/2010   Not Verified Unknown (comments) Simply avoids it Current Immunizations  Reviewed on 10/14/2011 Name Date Influenza Vaccine Split 10/5/2011 Not reviewed this visit You Were Diagnosed With   
  
 Codes Comments Well adult exam    -  Primary ICD-10-CM: Z00.00 ICD-9-CM: V70.0 Essential hypertension     ICD-10-CM: I10 
ICD-9-CM: 401.9 Hypercholesterolemia     ICD-10-CM: E78.00 ICD-9-CM: 272.0 Prediabetes     ICD-10-CM: R73.03 
ICD-9-CM: 790.29 Vitamin D deficiency     ICD-10-CM: E55.9 ICD-9-CM: 268.9 Osteopenia, unspecified location     ICD-10-CM: M85.80 ICD-9-CM: 733.90 Vitals BP Pulse Temp Resp Height(growth percentile) Weight(growth percentile) 138/76 (BP 1 Location: Right arm, BP Patient Position: Sitting) 72 97.2 °F (36.2 °C) (Oral) 16 5' 4\" (1.626 m) 156 lb (70.8 kg) LMP SpO2 BMI OB Status Smoking Status 08/08/2017 (Approximate) 98% 26.78 kg/m2 Postmenopausal Never Smoker Vitals History BMI and BSA Data Body Mass Index Body Surface Area  
 26.78 kg/m 2 1.79 m 2 Preferred Pharmacy Pharmacy Name Phone [address] [facility] [phone] Your Updated Medication List  
  
   
This list is accurate as of: 10/12/17  9:03 AM.  Always use your most recent med list.  
  
  
  
  
 hydroCHLOROthiazide 25 mg tablet Commonly known as:  HYDRODIURIL  
TAKE 1 TABLET DAILY  
  
 ibuprofen 200 mg tablet Commonly known as:  MOTRIN Take  by mouth every six (6) hours as needed for Pain. PREMPRO 0.45-1.5 mg per tablet Generic drug:  estrogen (conjugated)-medroxyPROGESTERone TAKE 1 TABLET DAILY Prescriptions Sent to Pharmacy Refills  
 hydroCHLOROthiazide (HYDRODIURIL) 25 mg tablet 1 Sig: TAKE 1 TABLET DAILY Class: Normal  
 Pharmacy: [address], [address] [place] [address] #: [phone] We Performed the Following AMB POC URINALYSIS DIP STICK AUTO W/O MICRO [79733 CPT(R)] CBC W/O DIFF [34484 CPT(R)] HEMOGLOBIN A1C WITH EAG [71469 CPT(R)] LIPID PANEL [90394 CPT(R)] METABOLIC PANEL, COMPREHENSIVE [45271 CPT(R)] TSH 3RD GENERATION [57122 CPT(R)] VITAMIN D, 25 HYDROXY E709642 CPT(R)] Follow-up Instructions Return in about 6 months (around 4/12/2018), or if symptoms worsen or fail to improve. Introducing [facility] SERVICES! Dear Rad Matias: Thank you for requesting a [employer] account. Our records indicate that you already have an active Building[employer] account. You can access your account anytime at [email or web address] Did you know that you can access your hospital and ER discharge instructions at any time in Building[employer]? You can also review all of your test results from your hospital stay or ER visit. Additional Information If you have questions, please visit the Frequently Asked Questions section of the [employer] website at [email or web address] Remember, [email or web address] is NOT to be used for urgent needs. For medical emergencies, dial 911. Now available from your iPhone and Android! Please provide this summary of care documentation to your next provider. Your primary care clinician is listed as Mitzi Owusu. If you have any questions after today's visit, please call [phone].

## 2017-10-13 LAB
25(OH)D3+25(OH)D2 SERPL-MCNC: 29.5 NG/ML (ref 30–100)
ALBUMIN SERPL-MCNC: 4.4 G/DL (ref 3.5–5.5)
ALBUMIN/GLOB SERPL: 1.5 {RATIO} (ref 1.2–2.2)
ALP SERPL-CCNC: 46 IU/L (ref 39–117)
ALT SERPL-CCNC: 9 IU/L (ref 0–32)
AST SERPL-CCNC: 16 IU/L (ref 0–40)
BILIRUB SERPL-MCNC: 0.5 MG/DL (ref 0–1.2)
BUN SERPL-MCNC: 9 MG/DL (ref 6–24)
BUN/CREAT SERPL: 13 (ref 9–23)
CALCIUM SERPL-MCNC: 9.4 MG/DL (ref 8.7–10.2)
CHLORIDE SERPL-SCNC: 98 MMOL/L (ref 96–106)
CHOLEST SERPL-MCNC: 190 MG/DL (ref 100–199)
CO2 SERPL-SCNC: 29 MMOL/L (ref 18–29)
CREAT SERPL-MCNC: 0.72 MG/DL (ref 0.57–1)
EST. AVERAGE GLUCOSE BLD GHB EST-MCNC: 120 MG/DL
GLOBULIN SER CALC-MCNC: 2.9 G/DL (ref 1.5–4.5)
GLUCOSE SERPL-MCNC: 95 MG/DL (ref 65–99)
HBA1C MFR BLD: 5.8 % (ref 4.8–5.6)
HCT VFR BLD AUTO: NORMAL %
HDLC SERPL-MCNC: 70 MG/DL
HGB BLD-MCNC: NORMAL G/DL
LDLC SERPL CALC-MCNC: 111 MG/DL (ref 0–99)
NRBC BLD AUTO-RTO: NORMAL %
PLATELET # BLD AUTO: NORMAL 10*3/UL
POTASSIUM SERPL-SCNC: 3.9 MMOL/L (ref 3.5–5.2)
PROT SERPL-MCNC: 7.3 G/DL (ref 6–8.5)
RBC # BLD AUTO: NORMAL 10*6/UL
SODIUM SERPL-SCNC: 141 MMOL/L (ref 134–144)
TRIGL SERPL-MCNC: 44 MG/DL (ref 0–149)
TSH SERPL DL<=0.005 MIU/L-ACNC: 0.73 UIU/ML (ref 0.45–4.5)
VLDLC SERPL CALC-MCNC: 9 MG/DL (ref 5–40)
WBC # BLD AUTO: NORMAL X10E3/UL

## 2018-01-29 DIAGNOSIS — N95.9 MENOPAUSAL AND POSTMENOPAUSAL DISORDER: Primary | ICD-10-CM

## 2018-01-29 DIAGNOSIS — M85.80 OSTEOPENIA: ICD-10-CM

## 2018-01-29 DIAGNOSIS — N95.1 HOT FLASHES, MENOPAUSAL: ICD-10-CM

## 2018-01-30 RX ORDER — CONJUGATED ESTROGENS AND MEDROXYPROGESTERONE ACETATE .45; 1.5 MG/1; MG/1
TABLET, SUGAR COATED ORAL
Qty: 84 TAB | Refills: 3 | Status: SHIPPED | OUTPATIENT
Start: 2018-01-30 | End: 2019-04-16 | Stop reason: ALTCHOICE

## 2018-03-30 ENCOUNTER — OFFICE VISIT (OUTPATIENT)
Dept: SURGERY | Age: 58
End: 2018-03-30

## 2018-03-30 VITALS
WEIGHT: 155.8 LBS | TEMPERATURE: 98.3 F | SYSTOLIC BLOOD PRESSURE: 136 MMHG | OXYGEN SATURATION: 99 % | HEART RATE: 84 BPM | HEIGHT: 64 IN | BODY MASS INDEX: 26.6 KG/M2 | DIASTOLIC BLOOD PRESSURE: 80 MMHG

## 2018-03-30 DIAGNOSIS — Z01.419 ENCOUNTER FOR GYNECOLOGICAL EXAMINATION (GENERAL) (ROUTINE) WITHOUT ABNORMAL FINDINGS: Primary | ICD-10-CM

## 2018-03-30 NOTE — PROGRESS NOTES
SUBJECTIVE: Dulce Maria Arreola is a 65 y.o. female [id number] (Abortions 0, Miscarriages 0), who presents with desire for annual well woman exam. Patient's last menstrual period was 2017 (approximate). Having severe hot flashes. On Prempro 0.45 mg. Allergies   Allergen Reactions    Codeine Unknown (comments)     Simply avoids it      Past Medical History:   Diagnosis Date    DJD (degenerative joint disease) of lumbar spine 1/15/2015    Hypercholesterolemia     Hypertension     Osteoporosis     Prediabetes 9/15/2016    Viral meningitis      Past Surgical History:   Procedure Laterality Date    ENDOSCOPY, COLON, DIAGNOSTIC      normal    HX ORTHOPAEDIC      tendon repair, hand    HX TUBAL LIGATION       OB History      Para Term  AB Living    1 1        SAB TAB Ectopic Molar Multiple Live Births                 Obstetric Comments     Also 1 adopted child        Family History   Problem Relation Age of Onset    Heart Disease Mother     Hypertension Father     Hypertension Sister     Hypertension Brother      Social History     Social History    Marital status:      Spouse name: N/A    Number of children: N/A    Years of education: N/A     Occupational History    Not on file. Social History Main Topics    Smoking status: Never Smoker    Smokeless tobacco: Never Used    Alcohol use No    Drug use: No    Sexual activity: Yes     Partners: Male     Birth control/ protection: Surgical     Other Topics Concern    Not on file     Social History Narrative     Current Outpatient Prescriptions   Medication Sig Dispense Refill    PREMPRO 0.45-1.5 mg per tablet TAKE 1 TABLET DAILY 84 Tab 3    hydroCHLOROthiazide (HYDRODIURIL) 25 mg tablet TAKE 1 TABLET DAILY 90 Tab 1       Review of Systems:   Constitutional: No weight change, chills or fever, anorexia, weakness or sleep disturbance . Cardiovascular: No chest pain, shortness of breath, or palpitations .   Respiratory: No cough, shortness of breath, hemoptysis, or orthopnea . Neurologic: No syncope, headaches or seizures . Hematologic: No easy bruising or unusual bleeding . Psychiatric: No insomnia, confusion, depression, or anxiety . GI:No nausea and vomiting, diarrhea or constipation  . : See HPI . Musculoskeletal: No joint pain or muscle pain . Endocrine: No polydipsia, polyuria, cold intolerance, excessive fatigue, or sleep disturbance . Integumentary: No breast pain, lumps, nipple discharge, or axillary lumps . Objective:     Visit Vitals    /80    Pulse 84    Temp 98.3 °F (36.8 °C) (Temporal)    Ht 5' 4\" (1.626 m)    Wt 155 lb 12.8 oz (70.7 kg)    LMP 08/08/2017 (Approximate)    SpO2 99%    BMI 26.74 kg/m2       General:  alert, cooperative, no distress, appears stated age   Skin:  no rash or abnormalities   Eyes: negative   Mouth: MMM no lesions   Lymph Nodes:  Cervical, supraclavicular, and axillary nodes normal.   Breast Exam: normal appearance, no masses or tenderness    Lungs:  clear to auscultation bilaterally   Heart:  regular rate and rhythm   Abdomen: soft, non-tender. Bowel sounds normal. No masses,  no organomegaly   Back:  Costovertebral angle tenderness absent   Genitourinary: Pelvic exam: VULVA: normal appearing vulva with no masses, tenderness or lesions, VAGINA: cottage cheese like discharge, CERVIX: normal appearing cervix without discharge or lesions, UTERUS: uterus is normal size, shape, consistency and nontender, ADNEXA: normal adnexa in size, nontender and no masses. Extremities:  extremities normal, atraumatic, no cyanosis or edema   Neurologic:  sensation grossly intact. Psychiatric:  non focal     12/02/2017  FINAL PATHOLOGIC DIAGNOSIS   Endometrial lining, biopsy:   Fragments of benign inactive endometrium       04/26/2016   Diagnosis:   Part A: CERVIX, POLYP:   BENIGN ENDOCERVICAL POLYP WITH ACUTE INFLAMMATION AND SQUAMOUS   METAPLASIA.    Part B: ENDOMETRIUM, BIOPSY: FRAGMENTS OF PROLIFERATIVE ENDOMETRIUM WITH BREAKDOWN CHANGES,   CONSISTENT WITH ANOVULATORY CYCLE. NO HYPERPLASIA OR CARCINOMA. SOL/04/26/2016     ASSESSMENT:      ICD-10-CM ICD-9-CM    1. Encounter for gynecological examination (general) (routine) without abnormal findings Z01.419 V72.31         Follow-up Disposition:  Return in about 1 year (around 3/30/2019), or if symptoms worsen or fail to improve.

## 2018-03-30 NOTE — MR AVS SNAPSHOT
[facility] [address] [address] [phone] Patient: Homar Saab MRN:  [id number] Visit Information Date & Time Provider Department Dept. Phone Encounter #  
 3/30/2018  1:00 PM Soha Rodríguez, [address] [place] [address] [address] [id number] Follow-up Instructions Return in about 1 year (around 3/30/2019), or if symptoms worsen or fail to improve. Follow-up and Disposition History Your Appointments 4/17/2018  8:30 AM  
ROUTINE CARE with Last Cuevas MD  
[address] at [facility] [address] [facility]) Appt Note: [facility] [address] [phone]  
[facility] Upcoming Health Maintenance Date Due  
 PAP AKA CERVICAL CYTOLOGY 2/19/2019 BREAST CANCER SCRN MAMMOGRAM 10/10/2019 COLONOSCOPY 7/22/2021 DTaP/Tdap/Td series (2 - Td) 9/7/2026 Allergies as of 3/30/2018  Review Complete On: 3/30/2018 By: Soha Rodríguez MD  
  
 Severity Noted Reaction Type Reactions Codeine  10/10/2010   Not Verified Unknown (comments) Simply avoids it Current Immunizations  Reviewed on 10/14/2011 Name Date Influenza Vaccine Split 10/5/2011 Not reviewed this visit You Were Diagnosed With   
  
 Codes Comments Encounter for gynecological examination (general) (routine) without abnormal findings    -  Primary ICD-10-CM: U83.749 ICD-9-CM: V72.31 Vitals BP Pulse Temp Height(growth percentile) Weight(growth percentile) LMP  
 136/80 84 98.3 °F (36.8 °C) (Temporal) 5' 4\" (1.626 m) 155 lb 12.8 oz (70.7 kg) 08/08/2017 (Approximate) SpO2 BMI OB Status Smoking Status 99% 26.74 kg/m2 Postmenopausal Never Smoker Vitals History BMI and BSA Data Body Mass Index Body Surface Area  
 26.74 kg/m 2 1.79 m 2 Preferred Pharmacy Pharmacy Name Phone [address] [facility] [phone] Your Updated Medication List  
  
   
This list is accurate as of 3/30/18  1:56 PM.  Always use your most recent med list.  
  
  
  
  
 hydroCHLOROthiazide 25 mg tablet Commonly known as:  HYDRODIURIL  
TAKE 1 TABLET DAILY PREMPRO 0.45-1.5 mg per tablet Generic drug:  estrogen (conjugated)-medroxyPROGESTERone TAKE 1 TABLET DAILY Follow-up Instructions Return in about 1 year (around 3/30/2019), or if symptoms worsen or fail to improve. Introducing \[facility]\"" & [facility] SERVICES! Dear [place]: Thank you for requesting a [employer] account. Our records indicate that you already have an active [employer] account. You can access your account anytime at [email or web address] Did you know that you can access your hospital and ER discharge instructions at any time in [employer]? You can also review all of your test results from your hospital stay or ER visit. Additional Information If you have questions, please visit the Frequently Asked Questions section of the [employer] website at [email or web address] Remember, [employer] is NOT to be used for urgent needs. For medical emergencies, dial 911. Now available from your iPhone and Android! Please provide this summary of care documentation to your next provider. Your primary care clinician is listed as Samreen Olivas. If you have any questions after today's visit, please call [phone].

## 2018-04-17 ENCOUNTER — OFFICE VISIT (OUTPATIENT)
Dept: FAMILY MEDICINE CLINIC | Age: 58
End: 2018-04-17

## 2018-04-17 VITALS
OXYGEN SATURATION: 100 % | HEART RATE: 74 BPM | RESPIRATION RATE: 16 BRPM | DIASTOLIC BLOOD PRESSURE: 83 MMHG | SYSTOLIC BLOOD PRESSURE: 131 MMHG | TEMPERATURE: 96.5 F | HEIGHT: 64 IN | BODY MASS INDEX: 26.63 KG/M2 | WEIGHT: 156 LBS

## 2018-04-17 DIAGNOSIS — I10 ESSENTIAL HYPERTENSION: Primary | ICD-10-CM

## 2018-04-17 DIAGNOSIS — Z79.899 ENCOUNTER FOR LONG-TERM (CURRENT) USE OF MEDICATIONS: ICD-10-CM

## 2018-04-17 DIAGNOSIS — E78.00 HYPERCHOLESTEROLEMIA: ICD-10-CM

## 2018-04-17 NOTE — PROGRESS NOTES
Chief Complaint   Patient presents with    Hypertension     6 months follow-up   1. Have you been to the ER, urgent care clinic since your last visit? Hospitalized since your last visit? Yes When: [facility] 1/2017 for Accidental Fall during the snow (injured left foot)    2. Have you seen or consulted any other health care providers outside of the [facility] since your last visit? Include any pap smears or colon screening.  Yes When: [facility] x2   Room #9

## 2018-04-17 NOTE — MR AVS SNAPSHOT
[address] [place] 
[phone] Patient: Iraida Hahn MRN:  [id number] Visit Information Date & Time Provider Department Dept. Phone Encounter #  
 4/17/2018  8:30 AM Socrates Ruiz MD [facility] at [address] [id number] Follow-up Instructions Return in about 6 months (around 10/17/2018) for Full annual exam.  
  
Your Appointments 4/5/2019  8:30 AM  
ROUTINE CARE with Alyse Collins MD  
[facility]) Appt Note: Well Woman $0CP  
 [address] [phone] [address] [place] [address] [address] [address] Upcoming Health Maintenance Date Due  
 PAP AKA CERVICAL CYTOLOGY 2/19/2019 BREAST CANCER SCRN MAMMOGRAM 10/10/2019 COLONOSCOPY 7/22/2021 DTaP/Tdap/Td series (2 - Td) 9/7/2026 Allergies as of 4/17/2018  Review Complete On: 4/17/2018 By: Socrates Ruiz MD  
  
 Severity Noted Reaction Type Reactions Codeine  10/10/2010   Not Verified Unknown (comments) Simply avoids it Current Immunizations  Reviewed on 10/14/2011 Name Date Influenza Vaccine Split 10/5/2011 Not reviewed this visit You Were Diagnosed With   
  
 Codes Comments Essential hypertension    -  Primary ICD-10-CM: I10 
ICD-9-CM: 401.9 Hypercholesterolemia     ICD-10-CM: E78.00 ICD-9-CM: 272.0 Encounter for long-term (current) use of medications     ICD-10-CM: Z79.899 ICD-9-CM: V58.69 Vitals BP Pulse Temp Resp Height(growth percentile) Weight(growth percentile) 131/83 (BP 1 Location: Right arm, BP Patient Position: Sitting) 74 96.5 °F (35.8 °C) (Oral) 16 5' 4\" (1.626 m) 156 lb (70.8 kg) LMP SpO2 BMI OB Status Smoking Status 08/08/2017 (Approximate) 100% 26.78 kg/m2 Postmenopausal Never Smoker Vitals History BMI and BSA Data Body Mass Index Body Surface Area  
 26.78 kg/m 2 1.79 m 2 Preferred Pharmacy Pharmacy Name Phone Jenny Cody, [facility] [phone] Your Updated Medication List  
  
   
This list is accurate as of 4/17/18  9:00 AM.  Always use your most recent med list.  
  
  
  
  
 hydroCHLOROthiazide 25 mg tablet Commonly known as:  HYDRODIURIL  
TAKE 1 TABLET DAILY PREMPRO 0.45-1.5 mg per tablet Generic drug:  estrogen (conjugated)-medroxyPROGESTERone TAKE 1 TABLET DAILY We Performed the Following METABOLIC PANEL, BASIC [57093 CPT(R)] Follow-up Instructions Return in about 6 months (around 10/17/2018) for Full annual exam.  
  
  
Introducing [address]! Dear Earl Fiore: Thank you for requesting a Edgeio account. Our records indicate that you already have an active Edgeio account. You can access your account anytime at [email or web address] Did you know that you can access your hospital and ER discharge instructions at any time in Edgeio? You can also review all of your test results from your hospital stay or ER visit. Additional Information If you have questions, please visit the Frequently Asked Questions section of the Edgeio website at [email or web address] Remember, Edgeio is NOT to be used for urgent needs. For medical emergencies, dial 911. Now available from your iPhone and Android! Please provide this summary of care documentation to your next provider. Your primary care clinician is listed as Guerrero Varghese. If you have any questions after today's visit, please call [phone].

## 2018-04-17 NOTE — PROGRESS NOTES
Subjective:     Chief Complaint   Patient presents with    Hypertension     6 months follow-up      She  is a 65y.o. year old female who presents for evaluation of:  Since last appt, had fall with the snow and injured her left foot. Seen at [facility] and sx resolved. Hyperlipidemia & HTN  Pt is doing well on current meds with no medication side effects noted. Does not check BP at home. No new myalgias, no joint pains, no weakness  No TIA's, no chest pain on exertion, no dyspnea on exertion, no swelling of ankles. Exercising - Minimal, walks at work  Dieting - No  Smoking - No     Lab Results   Component Value Date/Time    Cholesterol, total 190 10/12/2017 09:35 AM    HDL Cholesterol 70 10/12/2017 09:35 AM    LDL, calculated 111 (H) 10/12/2017 09:35 AM    Triglyceride 44 10/12/2017 09:35 AM     Review of Systems   Constitutional: Negative for chills, fever and weight loss. HENT: Negative. Respiratory: Negative for cough, shortness of breath and wheezing. Cardiovascular: Negative for chest pain and palpitations.    Rest per HPI    Objective:     Vitals:    04/17/18 0834   BP: 131/83   Pulse: 74   Resp: 16   Temp: 96.5 °F (35.8 °C)   TempSrc: Oral   SpO2: 100%   Weight: 156 lb (70.8 kg)   Height: 5' 4\" (1.626 m)     Physical Examination:  General appearance - alert, well appearing, and in no distress  Eyes -sclera anicteric  Neck - supple, no significant adenopathy, no thyromegaly  Chest - clear to auscultation, no wheezes, rales or rhonchi, symmetric air entry  Heart - normal rate, regular rhythm, normal S1, S2, no murmurs, rubs, clicks or gallops  Neurological - alert, oriented, normal speech, no focal findings or movement disorder noted  Extr - no edema    Allergies   Allergen Reactions    Codeine Unknown (comments)     Simply avoids it      Social History     Social History    Marital status:      Spouse name: N/A    Number of children: N/A    Years of education: N/A     Social History Main Topics    Smoking status: Never Smoker    Smokeless tobacco: Never Used    Alcohol use No    Drug use: No    Sexual activity: Yes     Partners: Male     Birth control/ protection: Surgical     Other Topics Concern    None     Social History Narrative      Family History   Problem Relation Age of Onset    Heart Disease Mother     Hypertension Father     Hypertension Sister     Hypertension Brother       Past Surgical History:   Procedure Laterality Date    ENDOSCOPY, COLON, DIAGNOSTIC  7/11    normal    HX ORTHOPAEDIC      tendon repair, hand    HX TUBAL LIGATION        Past Medical History:   Diagnosis Date    DJD (degenerative joint disease) of lumbar spine 1/15/2015    Hypercholesterolemia     Hypertension     Osteoporosis     Prediabetes 9/15/2016    Viral meningitis       Current Outpatient Prescriptions   Medication Sig Dispense Refill    PREMPRO 0.45-1.5 mg per tablet TAKE 1 TABLET DAILY 84 Tab 3    hydroCHLOROthiazide (HYDRODIURIL) 25 mg tablet TAKE 1 TABLET DAILY 90 Tab 1        Assessment/ Plan:   Diagnoses and all orders for this visit:    1. Essential hypertension - controlled  -     METABOLIC PANEL, BASIC    2. Hypercholesterolemia - stable    3. Encounter for long-term (current) use of medications  -     METABOLIC PANEL, BASIC    I have discussed the diagnosis with the patient and the intended plan as seen in the above orders. The patient has received an after-visit summary and questions were answered concerning future plans. I have discussed medication side effects and warnings with the patient as well.     Follow-up Disposition:  Return in about 6 months (around 10/17/2018) for Full annual exam.

## 2018-04-18 LAB
BUN SERPL-MCNC: 15 MG/DL (ref 6–24)
BUN/CREAT SERPL: 24 (ref 9–23)
CALCIUM SERPL-MCNC: 9.1 MG/DL (ref 8.7–10.2)
CHLORIDE SERPL-SCNC: 102 MMOL/L (ref 96–106)
CO2 SERPL-SCNC: 29 MMOL/L (ref 18–29)
CREAT SERPL-MCNC: 0.62 MG/DL (ref 0.57–1)
GFR SERPLBLD CREATININE-BSD FMLA CKD-EPI: 101 ML/MIN/1.73
GFR SERPLBLD CREATININE-BSD FMLA CKD-EPI: 117 ML/MIN/1.73
GLUCOSE SERPL-MCNC: 95 MG/DL (ref 65–99)
POTASSIUM SERPL-SCNC: 4 MMOL/L (ref 3.5–5.2)
SODIUM SERPL-SCNC: 143 MMOL/L (ref 134–144)

## 2018-04-23 DIAGNOSIS — I10 ESSENTIAL HYPERTENSION: ICD-10-CM

## 2018-04-23 RX ORDER — HYDROCHLOROTHIAZIDE 25 MG/1
TABLET ORAL
Qty: 90 TAB | Refills: 1 | Status: SHIPPED | COMMUNITY
Start: 2018-04-23 | End: 2018-10-16 | Stop reason: SDUPTHER

## 2018-10-16 ENCOUNTER — OFFICE VISIT (OUTPATIENT)
Dept: FAMILY MEDICINE CLINIC | Age: 58
End: 2018-10-16

## 2018-10-16 VITALS
RESPIRATION RATE: 16 BRPM | WEIGHT: 155 LBS | SYSTOLIC BLOOD PRESSURE: 135 MMHG | OXYGEN SATURATION: 98 % | TEMPERATURE: 95.9 F | BODY MASS INDEX: 26.46 KG/M2 | HEART RATE: 72 BPM | HEIGHT: 64 IN | DIASTOLIC BLOOD PRESSURE: 85 MMHG

## 2018-10-16 DIAGNOSIS — R73.03 PREDIABETES: ICD-10-CM

## 2018-10-16 DIAGNOSIS — I10 ESSENTIAL HYPERTENSION: ICD-10-CM

## 2018-10-16 DIAGNOSIS — Z00.00 WELL ADULT EXAM: Primary | ICD-10-CM

## 2018-10-16 DIAGNOSIS — M94.9 DISORDER OF BONE AND CARTILAGE: ICD-10-CM

## 2018-10-16 DIAGNOSIS — M89.9 DISORDER OF BONE AND CARTILAGE: ICD-10-CM

## 2018-10-16 DIAGNOSIS — E78.00 HYPERCHOLESTEROLEMIA: ICD-10-CM

## 2018-10-16 DIAGNOSIS — M85.80 OSTEOPENIA, UNSPECIFIED LOCATION: ICD-10-CM

## 2018-10-16 DIAGNOSIS — E55.9 VITAMIN D DEFICIENCY: ICD-10-CM

## 2018-10-16 LAB
BILIRUB UR QL STRIP: NEGATIVE
GLUCOSE UR-MCNC: NEGATIVE MG/DL
KETONES P FAST UR STRIP-MCNC: NEGATIVE MG/DL
PH UR STRIP: 7 [PH] (ref 4.6–8)
PROT UR QL STRIP: NEGATIVE
SP GR UR STRIP: 1.02 (ref 1–1.03)
UA UROBILINOGEN AMB POC: NORMAL (ref 0.2–1)
URINALYSIS CLARITY POC: CLEAR
URINALYSIS COLOR POC: YELLOW
URINE BLOOD POC: NEGATIVE
URINE LEUKOCYTES POC: NEGATIVE
URINE NITRITES POC: NEGATIVE

## 2018-10-16 RX ORDER — HYDROCHLOROTHIAZIDE 25 MG/1
TABLET ORAL
Qty: 90 TAB | Refills: 3 | Status: SHIPPED | OUTPATIENT
Start: 2018-10-16 | End: 2019-04-16 | Stop reason: SDUPTHER

## 2018-10-16 NOTE — PROGRESS NOTES
Subjective: Chief Complaint Patient presents with  Complete Physical  
  Annual  
  
She  is a 63y.o. year old female who presents for evaluation of: CPE 
UTD on colonoscopy and paps. mammo due this year. Was seeing Dr. Josh Holden for OB/GYN. Hyperlipidemia & HTN Pt is doing well on current meds with no medication side effects noted. Does not check BP at home. No new myalgias, no joint pains, no weakness No TIA's, no chest pain on exertion, no dyspnea on exertion, no swelling of ankles. Exercising - Minimal, walks at work Dieting - No 
Smoking - No 
  
Lab Results Component Value Date/Time Cholesterol, total 190 10/12/2017 09:35 AM  
 HDL Cholesterol 70 10/12/2017 09:35 AM  
 LDL, calculated 111 (H) 10/12/2017 09:35 AM  
 Triglyceride 44 10/12/2017 09:35 AM  
 
ROS: 
Constitutional: negative except for fevers, chills and fatigue Eyes: negative for visual disturbance Ears, nose, mouth, throat, and face: negative for hearing loss and sore throat Respiratory: negative for cough or dyspnea on exertion Cardiovascular: negative for chest pain, dyspnea, palpitations, fatigue Gastrointestinal: negative for nausea, vomiting, change in bowel habits, diarrhea and abdominal pain Genitourinary:negative for frequency and dysuria Skinno rashes Hematologic/lymphatic: negative for easy bruising and bleeding Musculoskeletal:negative for myalgias and muscle weakness Neurological: negative for headaches and dizziness Behavioral/Psych: negative for anxiety and depression Objective:  
 
Vitals:  
 10/16/18 0555 10/16/18 2651 BP: 144/83 135/85 Pulse: 72 Resp: 16 Temp: 95.9 °F (35.5 °C) TempSrc: Oral   
SpO2: 98% Weight: 155 lb (70.3 kg) Height: 5' 4\" (1.626 m) Physical Examination: 
General appearance - alert, well appearing, and in no distress Eyes - sclera anicteric, extraocular eye movements intact Earsnormal TMs bilaterally Nose - normal and patent, no erythema, discharge or polyps Mouth - mucous membranes moist, pharynx normal without lesions Neck - supple, no significant adenopathy Lymphatics - no palpable lymphadenopathy, no hepatosplenomegaly Chest - clear to auscultation, no wheezes, rales or rhonchi, symmetric air entry Heart - normal rate, regular rhythm, normal S1, S2, no murmurs, rubs, clicks or gallops Abdomen - soft, nontender, nondistended, no masses or organomegaly Breasts & Pelvic - exam declined by the patient as done by OB/GYN Neurological - alert, oriented, normal speech, no focal findings or movement disorder noted Musculoskeletal - no joint tenderness, deformity or swelling Extremities -no edema Psychnormal mood and affect Allergies Allergen Reactions  Codeine Unknown (comments) Simply avoids it Social History Social History  Marital status:  Spouse name: N/A  
 Number of children: N/A  
 Years of education: N/A Social History Main Topics  Smoking status: Never Smoker  Smokeless tobacco: Never Used  Alcohol use No  
 Drug use: No  
 Sexual activity: Yes  
  Partners: Male Birth control/ protection: Surgical  
 
Other Topics Concern  None Social History Narrative Family History Problem Relation Age of Onset  Heart Disease Mother  Hypertension Father  Hypertension Sister  Hypertension Brother Past Surgical History:  
Procedure Laterality Date  ENDOSCOPY, COLON, DIAGNOSTIC  7/11  
 normal  
 HX ORTHOPAEDIC    
 tendon repair, hand  HX TUBAL LIGATION Past Medical History:  
Diagnosis Date  DJD (degenerative joint disease) of lumbar spine 1/15/2015  Hypercholesterolemia  Hypertension  Osteoporosis  Prediabetes 9/15/2016  Viral meningitis Current Outpatient Prescriptions Medication Sig Dispense Refill  hydroCHLOROthiazide (HYDRODIURIL) 25 mg tablet TAKE 1 TABLET DAILY 90 Tab 3  PREMPRO 0.45-1.5 mg per tablet TAKE 1 TABLET DAILY 84 Tab 3 Assessment/ Plan:  
Diagnoses and all orders for this visit: 
 
1. Well adult exam 
-     CBC W/O DIFF 
-     LIPID PANEL 
-     HEMOGLOBIN A1C WITH EAG 
-     METABOLIC PANEL, COMPREHENSIVE 
-     TSH 3RD GENERATION 
-     AMB POC URINALYSIS DIP STICK AUTO W/O MICRO 2. Essential hypertensionwell controlled 
-     hydroCHLOROthiazide (HYDRODIURIL) 25 mg tablet; TAKE 1 TABLET DAILY 
-     METABOLIC PANEL, COMPREHENSIVE 
-     AMB POC URINALYSIS DIP STICK AUTO W/O MICRO 3. Hypercholesterolemiawell controlled -     LIPID PANEL 
-     HEMOGLOBIN A1C WITH EAG 
-     METABOLIC PANEL, COMPREHENSIVE 
-     TSH 3RD GENERATION 4. Prediabetesstable 
-     HEMOGLOBIN A1C WITH EAG 
 
5. Vitamin D deficiency 6. Osteopenia, unspecified location 
 - not taking Vit D supplement, last checked Vit D in 10/2017 and was 29. Setting up for another DEXA scan since this was last done in 2013. Continue working on weightbearing exercise. We also discussed Prempro and trying to taper off this if able. She will be seeing a new GYN since Dr. Marianela Santana has retired to discuss this further. We discussed other medication options like SSRIs and clonidine to help with hot flash symptoms and difficulty with sleeping if she desires. I have discussed the diagnosis with the patient and the intended plan as seen in the above orders. The patient has received an after-visit summary and questions were answered concerning future plans. I have discussed medication side effects and warnings with the patient as well. Follow-up Disposition: 
Return in about 6 months (around 4/16/2019).

## 2018-10-16 NOTE — MR AVS SNAPSHOT
[facility] [address] [facility] [address] 
[phone] Patient: Tierney Flores MRN:  [id number] Visit Information Date & Time Provider Department Dept. Phone Encounter #  
 10/16/2018  8:10 AM Joaquim Agarwal MD [facility] at [address] [id number] Follow-up Instructions Return in about 6 months (around 4/16/2019). Your Appointments 4/5/2019  8:30 AM  
ROUTINE CARE with Estefany Conrad MD  
[facility] [address]) Appt Note: Well Woman $0CP  
 [address] [phone] [address] [place] [address] [address] [address] Upcoming Health Maintenance Date Due Shingrix Vaccine Age 50> (1 of 2) 5/7/2011 Influenza Age 5 to Adult 3/21/2019* PAP AKA CERVICAL CYTOLOGY 2/19/2019 BREAST CANCER SCRN MAMMOGRAM 10/10/2019 COLONOSCOPY 7/22/2021 DTaP/Tdap/Td series (2 - Td) 9/7/2026 *Topic was postponed. The date shown is not the original due date. Allergies as of 10/16/2018  Review Complete On: 10/16/2018 By: Pop Guevara LPN Severity Noted Reaction Type Reactions Codeine  10/10/2010   Not Verified Unknown (comments) Simply avoids it Current Immunizations  Reviewed on 10/14/2011 Name Date Influenza Vaccine Split 10/5/2011 Not reviewed this visit You Were Diagnosed With   
  
 Codes Comments Well adult exam    -  Primary ICD-10-CM: Z00.00 ICD-9-CM: V70.0 Essential hypertension     ICD-10-CM: I10 
ICD-9-CM: 401.9 Hypercholesterolemia     ICD-10-CM: E78.00 ICD-9-CM: 272.0 Prediabetes     ICD-10-CM: R73.03 
ICD-9-CM: 790.29 Vitamin D deficiency     ICD-10-CM: E55.9 ICD-9-CM: 268.9 Osteopenia, unspecified location     ICD-10-CM: M85.80 ICD-9-CM: 733.90 Vitals BP Pulse Temp Resp Height(growth percentile) Weight(growth percentile) 135/85 (BP 1 Location: Left arm, BP Patient Position: Sitting) 72 95.9 °F (35.5 °C) (Oral) 16 5' 4\" (1.626 m) 155 lb (70.3 kg) LMP SpO2 BMI OB Status Smoking Status 08/08/2017 (Approximate) 98% 26.61 kg/m2 Postmenopausal Never Smoker Vitals History BMI and BSA Data Body Mass Index Body Surface Area  
 26.61 kg/m 2 1.78 m 2 Preferred Pharmacy Pharmacy Name Phone Jenny Cody, [facility] [phone] Your Updated Medication List  
  
   
This list is accurate as of 10/16/18  8:30 AM.  Always use your most recent med list.  
  
  
  
  
 hydroCHLOROthiazide 25 mg tablet Commonly known as:  HYDRODIURIL  
TAKE 1 TABLET DAILY PREMPRO 0.45-1.5 mg per tablet Generic drug:  estrogen (conjugated)-medroxyPROGESTERone TAKE 1 TABLET DAILY Prescriptions Sent to Pharmacy Refills  
 hydroCHLOROthiazide (HYDRODIURIL) 25 mg tablet 3 Sig: TAKE 1 TABLET DAILY Class: Normal  
 Pharmacy: [address] [place] [address], [address] [place] [address] #: [phone] We Performed the Following AMB POC URINALYSIS DIP STICK AUTO W/O MICRO [82543 CPT(R)] CBC W/O DIFF [20388 CPT(R)] HEMOGLOBIN A1C WITH EAG [81789 CPT(R)] LIPID PANEL [89661 CPT(R)] METABOLIC PANEL, COMPREHENSIVE [30965 CPT(R)] TSH 3RD GENERATION [00056 CPT(R)] Follow-up Instructions Return in about 6 months (around 4/16/2019). To-Do List   
 10/16/2018 Imaging:  [facility] MAMMO BI SCREENING INCL CAD   
  
 11/14/2018 8:00 AM  
  Appointment with [facility] 1 at [facility] ([phone]) Shower or bathe using soap and water.   Do not use deodorant, powder, perfumes, or lotion the day of your exam.  If your prior mammograms were not performed at [facility] 6 please bring films with you or forward prior images 2 days before your procedure. Check in at registration 15min before your appointment time unless you were instructed to do otherwise. A script is not necessary, but if you have one, please bring it on the day of the mammogram or have it faxed to the department. You are responsible for finding a method of transportation to your appointment. If you don't have transportation, please reschedule your appointment at least 24 hours in advance. [facility] [phone] [facility]  [phone] [facility] [address]  [phone] [facility] [phone] [facility] [address] [facility] [address] [phone] [facility] SERVICES! Dear Esther Baxter: Thank you for requesting a Unii account. Our records indicate that you already have an active Unii account. You can access your account anytime at [email or web address] Did you know that you can access your hospital and ER discharge instructions at any time in Unii? You can also review all of your test results from your hospital stay or ER visit. Additional Information If you have questions, please visit the Frequently Asked Questions section of the Unii website at [email or web address] Remember, Unii is NOT to be used for urgent needs. For medical emergencies, dial 911. Now available from your iPhone and Android! Please provide this summary of care documentation to your next provider. Your primary care clinician is listed as Surjit Serrato. If you have any questions after today's visit, please call [phone].

## 2018-10-16 NOTE — PROGRESS NOTES
Chief Complaint Patient presents with  Complete Physical  
  Annual  
1. Have you been to the ER, urgent care clinic since your last visit? Hospitalized since your last visit? No 
 
2. Have you seen or consulted any other health care providers outside of the [address] [place] [address] since your last visit? Include any pap smears or colon screening. No  
Check left ear Room 8

## 2018-10-17 LAB
ALBUMIN SERPL-MCNC: 4.4 G/DL (ref 3.5–5.5)
ALBUMIN/GLOB SERPL: 1.5 {RATIO} (ref 1.2–2.2)
ALP SERPL-CCNC: 54 IU/L (ref 39–117)
ALT SERPL-CCNC: 11 IU/L (ref 0–32)
AST SERPL-CCNC: 13 IU/L (ref 0–40)
BILIRUB SERPL-MCNC: 0.3 MG/DL (ref 0–1.2)
BUN SERPL-MCNC: 13 MG/DL (ref 6–24)
BUN/CREAT SERPL: 16 (ref 9–23)
CALCIUM SERPL-MCNC: 9.3 MG/DL (ref 8.7–10.2)
CHLORIDE SERPL-SCNC: 102 MMOL/L (ref 96–106)
CHOLEST SERPL-MCNC: 186 MG/DL (ref 100–199)
CO2 SERPL-SCNC: 27 MMOL/L (ref 20–29)
CREAT SERPL-MCNC: 0.82 MG/DL (ref 0.57–1)
ERYTHROCYTE [DISTWIDTH] IN BLOOD BY AUTOMATED COUNT: 15.6 % (ref 12.3–15.4)
EST. AVERAGE GLUCOSE BLD GHB EST-MCNC: 120 MG/DL
GLOBULIN SER CALC-MCNC: 2.9 G/DL (ref 1.5–4.5)
GLUCOSE SERPL-MCNC: 85 MG/DL (ref 65–99)
HBA1C MFR BLD: 5.8 % (ref 4.8–5.6)
HCT VFR BLD AUTO: 42.2 % (ref 34–46.6)
HDLC SERPL-MCNC: 72 MG/DL
HGB BLD-MCNC: 13.8 G/DL (ref 11.1–15.9)
LDLC SERPL CALC-MCNC: 107 MG/DL (ref 0–99)
MCH RBC QN AUTO: 25.7 PG (ref 26.6–33)
MCHC RBC AUTO-ENTMCNC: 32.7 G/DL (ref 31.5–35.7)
MCV RBC AUTO: 79 FL (ref 79–97)
PLATELET # BLD AUTO: 301 X10E3/UL (ref 150–379)
POTASSIUM SERPL-SCNC: 3.7 MMOL/L (ref 3.5–5.2)
PROT SERPL-MCNC: 7.3 G/DL (ref 6–8.5)
RBC # BLD AUTO: 5.37 X10E6/UL (ref 3.77–5.28)
SODIUM SERPL-SCNC: 144 MMOL/L (ref 134–144)
TRIGL SERPL-MCNC: 37 MG/DL (ref 0–149)
TSH SERPL DL<=0.005 MIU/L-ACNC: 0.75 UIU/ML (ref 0.45–4.5)
VLDLC SERPL CALC-MCNC: 7 MG/DL (ref 5–40)
WBC # BLD AUTO: 3.7 X10E3/UL (ref 3.4–10.8)

## 2018-11-14 ENCOUNTER — HOSPITAL ENCOUNTER (OUTPATIENT)
Dept: BONE DENSITY | Age: 58
Discharge: HOME | End: 2018-11-14
Attending: FAMILY MEDICINE
Payer: COMMERCIAL

## 2018-11-14 ENCOUNTER — HOSPITAL ENCOUNTER (OUTPATIENT)
Dept: MAMMOGRAPHY | Age: 58
Discharge: HOME | End: 2018-11-14
Attending: FAMILY MEDICINE
Payer: COMMERCIAL

## 2018-11-14 DIAGNOSIS — Z12.39 SCREENING BREAST EXAMINATION: ICD-10-CM

## 2018-11-14 DIAGNOSIS — M85.80 OSTEOPENIA, UNSPECIFIED LOCATION: ICD-10-CM

## 2018-11-14 DIAGNOSIS — M94.9 DISORDER OF BONE AND CARTILAGE: ICD-10-CM

## 2018-11-14 DIAGNOSIS — M89.9 DISORDER OF BONE AND CARTILAGE: ICD-10-CM

## 2018-11-14 PROCEDURE — 77080 DXA BONE DENSITY AXIAL: CPT

## 2018-11-14 PROCEDURE — 77067 SCR MAMMO BI INCL CAD: CPT

## 2019-04-16 ENCOUNTER — OFFICE VISIT (OUTPATIENT)
Dept: FAMILY MEDICINE CLINIC | Age: 59
End: 2019-04-16

## 2019-04-16 VITALS
HEART RATE: 85 BPM | BODY MASS INDEX: 26.56 KG/M2 | SYSTOLIC BLOOD PRESSURE: 133 MMHG | DIASTOLIC BLOOD PRESSURE: 71 MMHG | OXYGEN SATURATION: 98 % | HEIGHT: 64 IN | TEMPERATURE: 95.8 F | WEIGHT: 155.6 LBS | RESPIRATION RATE: 16 BRPM

## 2019-04-16 DIAGNOSIS — E55.9 VITAMIN D DEFICIENCY: ICD-10-CM

## 2019-04-16 DIAGNOSIS — E78.00 HYPERCHOLESTEROLEMIA: ICD-10-CM

## 2019-04-16 DIAGNOSIS — I10 ESSENTIAL HYPERTENSION: Primary | ICD-10-CM

## 2019-04-16 DIAGNOSIS — R73.03 PREDIABETES: ICD-10-CM

## 2019-04-16 DIAGNOSIS — N95.1 HOT FLASHES, MENOPAUSAL: ICD-10-CM

## 2019-04-16 RX ORDER — HYDROCHLOROTHIAZIDE 25 MG/1
TABLET ORAL
Qty: 90 TAB | Refills: 3 | Status: SHIPPED | OUTPATIENT
Start: 2019-04-16 | End: 2020-05-01 | Stop reason: SDUPTHER

## 2019-04-16 NOTE — PROGRESS NOTES
Subjective: Chief Complaint Patient presents with  Hypertension 6 month follow-up She  is a 63y.o. year old female who presents for evaluation of: 
 
Hyperlipidemia & HTN Pt is doing well on current meds with no medication side effects noted. Does not check BP at home. No new myalgias, no joint pains, no weakness No TIA's, no chest pain on exertion, no dyspnea on exertion, no swelling of ankles. Exercising - Minimal, walks at work Dieting - No 
Smoking - No 
  
Lab Results Component Value Date/Time Cholesterol, total 186 10/16/2018 09:00 AM  
 HDL Cholesterol 72 10/16/2018 09:00 AM  
 LDL, calculated 107 (H) 10/16/2018 09:00 AM  
 Triglyceride 37 10/16/2018 09:00 AM  
 
ROS Gen - no fever/chills Resp - no dyspnea or cough CV - no chest pain or MONSIVAIS 
GYN - ct to struggle with hot flashes vandana after coming off Prempro. Rest per HPI Objective:  
 
Vitals:  
 04/16/19 0820 BP: 133/71 Pulse: 85 Resp: 16 Temp: 95.8 °F (35.4 °C) TempSrc: Oral  
SpO2: 98% Weight: 155 lb 9.6 oz (70.6 kg) Height: 5' 4\" (1.626 m) Physical Examination: 
General appearance - alert, well appearing, and in no distress Eyes -sclera anicteric Neck - supple, no significant adenopathy, no thyromegaly Chest - clear to auscultation, no wheezes, rales or rhonchi, symmetric air entry Heart - normal rate, regular rhythm, normal S1, S2, no murmurs, rubs, clicks or gallops Neurological - alert, oriented, normal speech, no focal findings or movement disorder noted Extr - no edema Allergies Allergen Reactions  Codeine Unknown (comments) Simply avoids it Social History Socioeconomic History  Marital status:  Spouse name: Not on file  Number of children: Not on file  Years of education: Not on file  Highest education level: Not on file Tobacco Use  Smoking status: Never Smoker  Smokeless tobacco: Never Used Substance and Sexual Activity  Alcohol use: No  
 Drug use: No  
 Sexual activity: Yes  
  Partners: Male Birth control/protection: Surgical  
  
Family History Problem Relation Age of Onset  Heart Disease Mother  Hypertension Father  Hypertension Sister  Hypertension Brother Past Surgical History:  
Procedure Laterality Date  ENDOSCOPY, COLON, DIAGNOSTIC  7/11  
 normal  
 HX ORTHOPAEDIC    
 tendon repair, hand  HX TUBAL LIGATION Past Medical History:  
Diagnosis Date  DJD (degenerative joint disease) of lumbar spine 1/15/2015  Hypercholesterolemia  Hypertension  Osteoporosis  Prediabetes 9/15/2016  Viral meningitis Current Outpatient Medications Medication Sig Dispense Refill  hydroCHLOROthiazide (HYDRODIURIL) 25 mg tablet TAKE 1 TABLET DAILY 90 Tab 3  
 PREMPRO 0.45-1.5 mg per tablet TAKE 1 TABLET DAILY 84 Tab 3 Assessment/ Plan:  
Diagnoses and all orders for this visit: 1. Essential hypertension 
-     hydroCHLOROthiazide (HYDRODIURIL) 25 mg tablet; TAKE 1 TABLET DAILY 2. Hypercholesterolemia - controlled without meds 3. Prediabetes - stable, encouraged diet changes 4. Vitamin D deficiency - discussed use of OTC supplement 5. Hot flashes, menopausal - had long discussion about risks of HRT and benefits and other options like Effexor and Clonidine. Pt interested in OTC products and we also discussed black cohosh. She will check in with a new GYN since Dr. Miguel Montague has retired and consider tx options I have discussed the diagnosis with the patient and the intended plan as seen in the above orders. The patient has received an after-visit summary and questions were answered concerning future plans. I have discussed medication side effects and warnings with the patient as well. Follow-up and Dispositions · Return in about 6 months (around 10/16/2019), or if symptoms worsen or fail to improve.

## 2019-04-16 NOTE — PROGRESS NOTES
Chief Complaint Patient presents with  Hypertension 6 month follow-up 1. Have you been to the ER, urgent care clinic since your last visit? Hospitalized since your last visit? No 
 
2. Have you seen or consulted any other health care providers outside of the [address] [place] [address] since your last visit? Include any pap smears or colon screening. No  
Discuss hot flashes and insomnia- stopped Prempro

## 2019-10-01 ENCOUNTER — OFFICE VISIT (OUTPATIENT)
Dept: FAMILY MEDICINE CLINIC | Age: 59
End: 2019-10-01

## 2019-10-01 VITALS
BODY MASS INDEX: 27.04 KG/M2 | SYSTOLIC BLOOD PRESSURE: 116 MMHG | OXYGEN SATURATION: 99 % | DIASTOLIC BLOOD PRESSURE: 79 MMHG | TEMPERATURE: 97.2 F | RESPIRATION RATE: 16 BRPM | WEIGHT: 158.4 LBS | HEIGHT: 64 IN | HEART RATE: 91 BPM

## 2019-10-01 DIAGNOSIS — I10 ESSENTIAL HYPERTENSION: ICD-10-CM

## 2019-10-01 DIAGNOSIS — R73.03 PREDIABETES: ICD-10-CM

## 2019-10-01 DIAGNOSIS — E78.00 HYPERCHOLESTEROLEMIA: ICD-10-CM

## 2019-10-01 DIAGNOSIS — Z00.00 WELL ADULT EXAM: Primary | ICD-10-CM

## 2019-10-01 NOTE — PROGRESS NOTES
Chief Complaint   Patient presents with    Hypertension     6 month follow-up   1. Have you been to the ER, urgent care clinic since your last visit? Hospitalized since your last visit? No    2. Have you seen or consulted any other health care providers outside of the [address] [place] [address] since your last visit? Include any pap smears or colon screening.  No   Discuss sided pain after sitting and tingling in hands

## 2019-10-01 NOTE — PROGRESS NOTES
Subjective:     Chief Complaint   Patient presents with    Hypertension     6 month follow-up      She  is a 63y.o. year old female who presents for evaluation of: CPE  UTD on colonoscopy and paps. Last mammogram was 11/14/2018 and was normal.   Was seeing Dr. Juanita Kruger for OB/GYN. Previously noted to have osteopenia but most recent DEXA scan in 11/2018 was normal.    Hyperlipidemia & HTN  Pt is doing well on current meds with no medication side effects noted. Does not check BP at home. No new myalgias, no joint pains, no weakness  No TIA's, no chest pain on exertion, no dyspnea on exertion, no swelling of ankles.   Exercising - Minimal, walks at work  Dieting - No  Smoking - No     Lab Results   Component Value Date/Time    Cholesterol, total 186 10/16/2018 09:00 AM    HDL Cholesterol 72 10/16/2018 09:00 AM    LDL, calculated 107 (H) 10/16/2018 09:00 AM    Triglyceride 37 10/16/2018 09:00 AM     ROS:  Constitutional: negative except for fevers, chills and fatigue  Eyes: negative for visual disturbance  Ears, nose, mouth, throat, and face: negative for hearing loss and sore throat  Respiratory: negative for cough or dyspnea on exertion  Cardiovascular: negative for chest pain, dyspnea, palpitations, fatigue  Gastrointestinal: negative for nausea, vomiting, change in bowel habits, diarrhea and abdominal pain  Genitourinary:negative for frequency and dysuria  Skin-no rashes  Hematologic/lymphatic: negative for easy bruising and bleeding  Musculoskeletal:negative for myalgias and muscle weakness  Neurological: negative for headaches and dizziness  Behavioral/Psych: negative for anxiety and depression    Objective:     Vitals:    10/01/19 0958   Resp: 16   Weight: 158 lb 6.4 oz (71.8 kg)   Height: 5' 4\" (1.626 m)     Physical Examination:  General appearance - alert, well appearing, and in no distress  Eyes - sclera anicteric, extraocular eye movements intact  Ears-normal TMs bilaterally  Nose - normal and patent, no erythema, discharge or polyps  Mouth - mucous membranes moist, pharynx normal without lesions  Neck - supple, no significant adenopathy  Lymphatics - no palpable lymphadenopathy, no hepatosplenomegaly  Chest - clear to auscultation, no wheezes, rales or rhonchi, symmetric air entry  Heart - normal rate, regular rhythm, normal S1, S2, no murmurs, rubs, clicks or gallops  Abdomen - soft, nontender, nondistended, no masses or organomegaly  Breasts & Pelvic - exam declined by the patient as done by OB/GYN  Neurological - alert, oriented, normal speech, no focal findings or movement disorder noted  Musculoskeletal - no joint tenderness, deformity or swelling  Extremities -no edema  Psych-normal mood and affect    Allergies   Allergen Reactions    Codeine Unknown (comments)     Simply avoids it      Social History     Socioeconomic History    Marital status:      Spouse name: Not on file    Number of children: Not on file    Years of education: Not on file    Highest education level: Not on file   Tobacco Use    Smoking status: Never Smoker    Smokeless tobacco: Never Used   Substance and Sexual Activity    Alcohol use: No    Drug use: No    Sexual activity: Yes     Partners: Male     Birth control/protection: Surgical      Family History   Problem Relation Age of Onset    Heart Disease Mother     Hypertension Father     Hypertension Sister     Hypertension Brother       Past Surgical History:   Procedure Laterality Date    ENDOSCOPY, COLON, DIAGNOSTIC  7/11    normal    HX ORTHOPAEDIC      tendon repair, hand    HX TUBAL LIGATION        Past Medical History:   Diagnosis Date    DJD (degenerative joint disease) of lumbar spine 1/15/2015    Hypercholesterolemia     Hypertension     Osteoporosis     Prediabetes 9/15/2016    Viral meningitis       Current Outpatient Medications   Medication Sig Dispense Refill    hydroCHLOROthiazide (HYDRODIURIL) 25 mg tablet TAKE 1 TABLET DAILY 90 Tab 3 Assessment/ Plan:   Diagnoses and all orders for this visit:    1. Well adult exam-encourage working on diet and exercise again  -     CBC W/O DIFF  -     LIPID PANEL  -     HEMOGLOBIN A1C WITH EAG  -     METABOLIC PANEL, COMPREHENSIVE  -     TSH 3RD GENERATION  -     AMB POC URINALYSIS DIP STICK AUTO W/O MICRO    2. Essential hypertension-well controlled  -     hydroCHLOROthiazide (HYDRODIURIL) 25 mg tablet; TAKE 1 TABLET DAILY  -     METABOLIC PANEL, COMPREHENSIVE  -     AMB POC URINALYSIS DIP STICK AUTO W/O MICRO    3. Hypercholesterolemia-well controlled  -     LIPID PANEL  -     HEMOGLOBIN A1C WITH EAG  -     METABOLIC PANEL, COMPREHENSIVE  -     TSH 3RD GENERATION    4. Prediabetes-stable  -     HEMOGLOBIN A1C WITH EAG    She is planning to make an appointment with her new GYN for her next Pap smear    I have discussed the diagnosis with the patient and the intended plan as seen in the above orders. The patient has received an after-visit summary and questions were answered concerning future plans. I have discussed medication side effects and warnings with the patient as well. Follow-up and Dispositions    · Return in about 6 months (around 4/1/2020), or if symptoms worsen or fail to improve.

## 2019-10-02 LAB
ALBUMIN SERPL-MCNC: 4.6 G/DL (ref 3.5–5.5)
ALBUMIN/GLOB SERPL: 1.7 {RATIO} (ref 1.2–2.2)
ALP SERPL-CCNC: 61 IU/L (ref 39–117)
ALT SERPL-CCNC: 10 IU/L (ref 0–32)
APPEARANCE UR: CLEAR
AST SERPL-CCNC: 14 IU/L (ref 0–40)
BILIRUB SERPL-MCNC: 0.3 MG/DL (ref 0–1.2)
BILIRUB UR QL STRIP: NEGATIVE
BUN SERPL-MCNC: 18 MG/DL (ref 6–24)
BUN/CREAT SERPL: 24 (ref 9–23)
CALCIUM SERPL-MCNC: 9.7 MG/DL (ref 8.7–10.2)
CHLORIDE SERPL-SCNC: 102 MMOL/L (ref 96–106)
CHOLEST SERPL-MCNC: 192 MG/DL (ref 100–199)
CO2 SERPL-SCNC: 28 MMOL/L (ref 20–29)
COLOR UR: YELLOW
CREAT SERPL-MCNC: 0.76 MG/DL (ref 0.57–1)
ERYTHROCYTE [DISTWIDTH] IN BLOOD BY AUTOMATED COUNT: 15.9 % (ref 12.3–15.4)
EST. AVERAGE GLUCOSE BLD GHB EST-MCNC: 128 MG/DL
GLOBULIN SER CALC-MCNC: 2.7 G/DL (ref 1.5–4.5)
GLUCOSE SERPL-MCNC: 90 MG/DL (ref 65–99)
GLUCOSE UR QL: NEGATIVE
HBA1C MFR BLD: 6.1 % (ref 4.8–5.6)
HCT VFR BLD AUTO: 43.7 % (ref 34–46.6)
HDLC SERPL-MCNC: 76 MG/DL
HGB BLD-MCNC: 13.7 G/DL (ref 11.1–15.9)
HGB UR QL STRIP: NEGATIVE
KETONES UR QL STRIP: NEGATIVE
LDLC SERPL CALC-MCNC: 110 MG/DL (ref 0–99)
LEUKOCYTE ESTERASE UR QL STRIP: NEGATIVE
MCH RBC QN AUTO: 24.7 PG (ref 26.6–33)
MCHC RBC AUTO-ENTMCNC: 31.4 G/DL (ref 31.5–35.7)
MCV RBC AUTO: 79 FL (ref 79–97)
MICRO URNS: NORMAL
NITRITE UR QL STRIP: NEGATIVE
PH UR STRIP: 7 [PH] (ref 5–7.5)
PLATELET # BLD AUTO: 307 X10E3/UL (ref 150–450)
POTASSIUM SERPL-SCNC: 4.4 MMOL/L (ref 3.5–5.2)
PROT SERPL-MCNC: 7.3 G/DL (ref 6–8.5)
PROT UR QL STRIP: NEGATIVE
RBC # BLD AUTO: 5.54 X10E6/UL (ref 3.77–5.28)
SODIUM SERPL-SCNC: 145 MMOL/L (ref 134–144)
SP GR UR: 1.02 (ref 1–1.03)
TRIGL SERPL-MCNC: 31 MG/DL (ref 0–149)
TSH SERPL DL<=0.005 MIU/L-ACNC: 0.42 UIU/ML (ref 0.45–4.5)
UROBILINOGEN UR STRIP-MCNC: 0.2 MG/DL (ref 0.2–1)
VLDLC SERPL CALC-MCNC: 6 MG/DL (ref 5–40)
WBC # BLD AUTO: 3.9 X10E3/UL (ref 3.4–10.8)

## 2019-11-15 ENCOUNTER — HOSPITAL ENCOUNTER (OUTPATIENT)
Dept: MAMMOGRAPHY | Age: 59
Discharge: HOME | End: 2019-11-15
Attending: FAMILY MEDICINE
Payer: COMMERCIAL

## 2019-11-15 DIAGNOSIS — Z12.31 VISIT FOR SCREENING MAMMOGRAM: ICD-10-CM

## 2019-11-15 PROCEDURE — 77067 SCR MAMMO BI INCL CAD: CPT

## 2020-02-24 NOTE — PATIENT INSTRUCTIONS
[facility]  [facility]  [facility] [address] [facility]  [address]  [address]  Dept: [phone]  Dept Fax: [phone]  Loc: [phone]    Subjective:      Chief Complaint: Venkata Michelle is a 62 y.o. female with anemia. Vamshi Vergara was initially diagnosed with iron deficiency and anemia in October 2015. The patient did undergo upper and lower gastrointestinal evaluation in November 2015. This was reported as unremarkable. HPI:    Vamshi Vergara is here today for follow-up regarding a history of anemia. She does report that she has had increased fatigue and was concerned that her anemia had worsened. Her fatigue is been present the last few weeks. However, on today's laboratory studies her hemoglobin hematocrit have improved. Her hemoglobin is 13.5 and hematocrit is 40.6. She does not have any clear evidence of blood loss. The patient reports that her bowel and bladder habits have been normal.  She has not seen blood in her stool or urine. The patient denies shortness of breath or chest pain. She has not had fever or other signs of infection. ECOG performance status is level 0. PMH, SH, and FH:  I reviewed the patients medication list and allergy list as noted on the electronic medical record. The PMH, SH and FH were also reviewed as noted on the EMR. Review of Systems  Constitutional: Fatigue. HENT: Negative. Eyes: Negative. Respiratory: Negative. Cardiovascular: Negative. Gastrointestinal: Negative. Genitourinary: Negative. Musculoskeletal: Negative. Skin: Negative. Neurological: Negative. Hematological: Negative. Psychiatric/Behavioral: Negative.      Objective:   Physical Exam  Vitals:    02/24/20 0916   BP: 136/79   Site: Left Upper Arm   Position: Sitting   Cuff Size: Medium Adult   Pulse: 84   Resp: 18   Temp: 98.7 °F (37.1 °C)   TempSrc: Oral   SpO2: Tension Headache: Care Instructions  Your Care Instructions  Most headaches are tension headaches. These headaches tend to happen again, especially if you are under stress. A tension headache may cause pain or a feeling of pressure all over your head. You probably can't pinpoint the center of the pain. If you keep getting tension headaches, the best thing you can do to limit them is to find out what is causing them and then make changes in those areas. Follow-up care is a key part of your treatment and safety. Be sure to make and go to all appointments, and call your doctor if you are having problems. Its also a good idea to know your test results and keep a list of the medicines you take. How can you care for yourself at home? · Rest in a quiet, dark room with a cool cloth on your forehead until your headache is gone. Close your eyes, and try to relax or go to sleep. Don't watch TV or read. Avoid using the computer. · Use a warm, moist towel or a heating pad set on low to relax tight shoulder and neck muscles. · Have someone gently massage your neck and shoulders. · Take pain medicines exactly as directed. ¨ If the doctor gave you a prescription medicine for pain, take it as prescribed. ¨ If you are not taking a prescription pain medicine, ask your doctor if you can take an over-the-counter medicine. · Be careful not to take pain medicine more often than the instructions allow, because you may get worse or more frequent headaches when the medicine wears off. · If you get another tension headache, stop what you are doing and sit quietly for a moment. Close your eyes and breathe slowly. Try to relax your head and neck muscles. · Do not ignore new symptoms that occur with a headache, such as fever, weakness or numbness, vision changes, or confusion. These may be signs of a more serious problem. To help prevent headaches  · Keep a headache diary so you can figure out what triggers your headaches. Avoiding triggers may help you prevent headaches. Record when each headache began, how long it lasted, and what the pain was like (throbbing, aching, stabbing, or dull). List anything that may have triggered the headache, such as being physically or emotionally stressed or being anxious or depressed. Other possible triggers are hunger, anger, fatigue, poor posture, and muscle strain. · Find healthy ways to deal with stress. Headaches are most common during or right after stressful times. Take time to relax before and after you do something that has caused a headache in the past.  · Exercise daily to relieve stress. Relaxation exercises may help reduce tension. · Get plenty of sleep. · Eat regularly and well. Long periods without food can trigger a headache. · Treat yourself to a massage. Some people find that massages are very helpful in relieving tension. · Try to keep your muscles relaxed by keeping good posture. Check your jaw, face, neck, and shoulder muscles for tension, and try to relax them. When sitting at a desk, change positions often, and stretch for 30 seconds each hour. · Reduce eyestrain from computers by blinking frequently and looking away from the computer screen every so often. Make sure you have proper eyewear and that your monitor is set up properly, about an arms length away. When should you call for help? Call 911 anytime you think you may need emergency care. For example, call if:  · You have signs of a stroke. These may include:  ¨ Sudden numbness, paralysis, or weakness in your face, arm, or leg, especially on only one side of your body. ¨ Sudden vision changes. ¨ Sudden trouble speaking. ¨ Sudden confusion or trouble understanding simple statements. ¨ Sudden problems with walking or balance. ¨ A sudden, severe headache that is different from past headaches. Call your doctor now or seek immediate medical care if:  · You have new or worse nausea and vomiting.   · You have a new or higher fever. · Your headache gets much worse. Watch closely for changes in your health, and be sure to contact your doctor if:  · You are not getting better after 2 days (48 hours). Where can you learn more? Go to http://lachelle-sb.info/. Enter 84 17 [id number] in the search box to learn more about \"Tension Headache: Care Instructions. \"  Current as of: February 19, 2016  Content Version: 11.1  © 7100-7594 [employer]. Care instructions adapted under license by [employer] (which disclaims liability or warranty for this information). If you have questions about a medical condition or this instruction, always ask your healthcare professional. [address] any warranty or liability for your use of this information.

## 2020-05-01 DIAGNOSIS — I10 ESSENTIAL HYPERTENSION: ICD-10-CM

## 2020-05-05 RX ORDER — HYDROCHLOROTHIAZIDE 25 MG/1
TABLET ORAL
Qty: 90 TAB | Refills: 0 | Status: SHIPPED | OUTPATIENT
Start: 2020-05-05 | End: 2020-08-24 | Stop reason: SDUPTHER

## 2020-08-24 ENCOUNTER — VIRTUAL VISIT (OUTPATIENT)
Dept: FAMILY MEDICINE CLINIC | Age: 60
End: 2020-08-24
Payer: COMMERCIAL

## 2020-08-24 DIAGNOSIS — I10 ESSENTIAL HYPERTENSION: Primary | ICD-10-CM

## 2020-08-24 PROCEDURE — 99441 PR PHYS/QHP TELEPHONE EVALUATION 5-10 MIN: CPT | Performed by: FAMILY MEDICINE

## 2020-08-24 RX ORDER — HYDROCHLOROTHIAZIDE 25 MG/1
TABLET ORAL
Qty: 90 TAB | Refills: 1 | Status: SHIPPED | OUTPATIENT
Start: 2020-08-24 | End: 2021-03-10

## 2020-08-24 RX ORDER — ESTRADIOL AND NORETHINDRONE ACETATE .5; .1 MG/1; MG/1
TABLET ORAL
COMMUNITY
Start: 2020-08-04 | End: 2022-04-05 | Stop reason: SDUPTHER

## 2020-08-24 NOTE — PROGRESS NOTES
Shanel Ribera is a 62 y.o. female, evaluated via audio-only technology on 8/24/2020 for Medication Refill  . Assessment & Plan:   Diagnoses and all orders for this visit:    1. Essential hypertension - controlled, ct HCTZ and check labs soon  -     hydroCHLOROthiazide (HYDRODIURIL) 25 mg tablet; TAKE 1 TABLET DAILY. m      Follow-up and Dispositions    · Return in about 6 months (around 2/24/2021) for Full annual exam.         12  Subjective:     Doing well with no sig concerns    Hyperlipidemia & HTN  Pt is doing well on current meds with no medication side effects noted. Does not check BP at home. No new myalgias, no joint pains, no weakness  No TIA's, no chest pain on exertion, no dyspnea on exertion, no swelling of ankles. Exercising - Minimal, walks at work  Dieting - No  Smoking - No    Prior to Admission medications    Medication Sig Start Date End Date Taking? Authorizing Provider   Amabelz 0.5-0.1 mg per tablet  8/4/20  Yes Provider, Historical   hydroCHLOROthiazide (HYDRODIURIL) 25 mg tablet TAKE 1 TABLET DAILY.m 8/24/20  Yes Juancho Victor MD   hydroCHLOROthiazide (HYDRODIURIL) 25 mg tablet TAKE 1 TABLET DAILY. Please call office and schedule 6 month follow-up with Dr Chris Jimenez.  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 Arnaldo Meyer, who was evaluated through a patient-initiated, synchronous (real-time) audio only encounter, and/or her healthcare decision maker, is aware that it is a billable service, with coverage as determined by her insurance carrier. She provided verbal consent to proceed: Yes. She has not had a related appointment within my department in the past 7 days or scheduled within the next 24 hours.       Total Time: minutes: 5-10 minutes    Kayley Cramer MD

## 2020-10-26 ENCOUNTER — TRANSCRIBE ORDER (OUTPATIENT)
Dept: SCHEDULING | Age: 60
End: 2020-10-26

## 2020-10-26 DIAGNOSIS — Z12.31 VISIT FOR SCREENING MAMMOGRAM: Primary | ICD-10-CM

## 2020-12-14 ENCOUNTER — HOSPITAL ENCOUNTER (OUTPATIENT)
Dept: MAMMOGRAPHY | Age: 60
Discharge: HOME | End: 2020-12-14
Attending: FAMILY MEDICINE
Payer: COMMERCIAL

## 2020-12-14 DIAGNOSIS — Z12.31 VISIT FOR SCREENING MAMMOGRAM: ICD-10-CM

## 2020-12-14 PROCEDURE — 77067 SCR MAMMO BI INCL CAD: CPT

## 2020-12-16 ENCOUNTER — TELEPHONE (OUTPATIENT)
Dept: FAMILY MEDICINE CLINIC | Age: 60
End: 2020-12-16

## 2021-01-29 ENCOUNTER — OFFICE VISIT (OUTPATIENT)
Dept: FAMILY MEDICINE CLINIC | Age: 61
End: 2021-01-29
Payer: COMMERCIAL

## 2021-01-29 VITALS
DIASTOLIC BLOOD PRESSURE: 93 MMHG | HEIGHT: 64 IN | OXYGEN SATURATION: 100 % | BODY MASS INDEX: 27.72 KG/M2 | SYSTOLIC BLOOD PRESSURE: 139 MMHG | TEMPERATURE: 96.6 F | HEART RATE: 77 BPM | RESPIRATION RATE: 16 BRPM | WEIGHT: 162.4 LBS

## 2021-01-29 DIAGNOSIS — Z00.00 WELL ADULT EXAM: Primary | ICD-10-CM

## 2021-01-29 DIAGNOSIS — E78.00 HYPERCHOLESTEROLEMIA: ICD-10-CM

## 2021-01-29 DIAGNOSIS — R73.03 PREDIABETES: ICD-10-CM

## 2021-01-29 DIAGNOSIS — E55.9 VITAMIN D DEFICIENCY: ICD-10-CM

## 2021-01-29 DIAGNOSIS — I10 ESSENTIAL HYPERTENSION: ICD-10-CM

## 2021-01-29 PROCEDURE — 99396 PREV VISIT EST AGE 40-64: CPT | Performed by: FAMILY MEDICINE

## 2021-01-29 RX ORDER — MELOXICAM 15 MG/1
TABLET ORAL
COMMUNITY
Start: 2020-12-15 | End: 2022-04-05 | Stop reason: ALTCHOICE

## 2021-01-29 NOTE — PROGRESS NOTES
Chief Complaint   Patient presents with    Follow-up         1. Have you been to the ER, urgent care clinic since your last visit? Hospitalized since your last visit? No    2. Have you seen or consulted any other health care providers outside of the [address] [place] [address] since your last visit? Include any pap smears or colon screening.  No

## 2021-01-29 NOTE — PROGRESS NOTES
Sherley Chow (: 1960) is a 62 y.o. female, established patient, here for evaluation of the following chief complaint(s):  Follow-up       ASSESSMENT/PLAN: Doing well overall. Up-to-date on routine screenings. Discussed Covid vaccine and patient is hesitant as she has access to this through her work. Blood pressure running slightly high today so we will monitor. Also discussed her heel spur and plantar fasciitis. She continues working with podiatry on this. Encouraged weight loss with diet and exercise. 1. Well adult exam  -     HEMOGLOBIN A1C WITH EAG  -     LIPID PANEL  -     METABOLIC PANEL, COMPREHENSIVE  -     TSH 3RD GENERATION  -     CBC W/O DIFF  -     VITAMIN D, 25 HYDROXY    2. Essential hypertension  -     METABOLIC PANEL, COMPREHENSIVE    3. Hypercholesterolemia  -     HEMOGLOBIN A1C WITH EAG  -     LIPID PANEL  -     METABOLIC PANEL, COMPREHENSIVE  -     TSH 3RD GENERATION    4. Prediabetes  -     HEMOGLOBIN A1C WITH EAG    5. Vitamin D deficiency  -     VITAMIN D, 25 HYDROXY        Return in about 6 months (around 2021), or if symptoms worsen or fail to improve. Subjective:     Chief Complaint   Patient presents with    Follow-up      She  is a 62y.o. year old female who presents for evaluation of: CPE  UTD on colonoscopy and paps. Last mammogram was 2018 and was normal.   Was seeing Dr. Reza Burton for OB/GYN. Previously noted to have osteopenia but most recent DEXA scan in 2018 was normal.    Hyperlipidemia & HTN  Pt is doing well on current meds with no medication side effects noted. Does not check BP at home. No new myalgias, no joint pains, no weakness  No TIA's, no chest pain on exertion, no dyspnea on exertion, no swelling of ankles.   Exercising - Minimal, walks at work  Dieting - No  Smoking - No     Lab Results   Component Value Date/Time    Cholesterol, total 192 10/01/2019 11:07 AM    HDL Cholesterol 76 10/01/2019 11:07 AM    LDL, calculated 110 (H) 10/01/2019 11:07 AM    Triglyceride 31 10/01/2019 11:07 AM     ROS:  Constitutional: negative except for fevers, chills and fatigue  Eyes: negative for visual disturbance  Ears, nose, mouth, throat, and face: negative for hearing loss and sore throat  Respiratory: negative for cough or dyspnea on exertion  Cardiovascular: negative for chest pain, dyspnea, palpitations, fatigue  Gastrointestinal: negative for nausea, vomiting, change in bowel habits, diarrhea and abdominal pain  Genitourinary:negative for frequency and dysuria  Skinno rashes  Hematologic/lymphatic: negative for easy bruising and bleeding  Musculoskeletal:negative for myalgias and muscle weakness  Neurological: negative for headaches and dizziness  Behavioral/Psych: negative for anxiety and depression    Objective:     Vitals:    01/29/21 1214   BP: (!) 139/93   Pulse: 77   Resp: 16   Temp: (!) 96.6 °F (35.9 °C)   TempSrc: Temporal   SpO2: 100%   Weight: 162 lb 6.4 oz (73.7 kg)   Height: 5' 4\" (1.626 m)     Physical Examination:  General appearance - alert, well appearing, and in no distress  Eyes - sclera anicteric, extraocular eye movements intact  Neck - supple, no significant adenopathy  Lymphatics - no palpable lymphadenopathy, no hepatosplenomegaly  Chest - clear to auscultation, no wheezes, rales or rhonchi, symmetric air entry  Heart - normal rate, regular rhythm, normal S1, S2, no murmurs, rubs, clicks or gallops  Abdomen - soft, nontender, nondistended, no masses or organomegaly  Breasts & Pelvic - exam declined by the patient as done by OB/GYN  Neurological - alert, oriented, normal speech, no focal findings or movement disorder noted  Musculoskeletal - no joint tenderness, deformity or swelling  Extremities -no edema  Psychnormal mood and affect    Allergies   Allergen Reactions    Codeine Unknown (comments)     Simply avoids it      Social History     Socioeconomic History    Marital status:      Spouse name: Not on file    Number of children: Not on file    Years of education: Not on file    Highest education level: Not on file   Tobacco Use    Smoking status: Never Smoker    Smokeless tobacco: Never Used   Substance and Sexual Activity    Alcohol use: No    Drug use: No    Sexual activity: Yes     Partners: Male     Birth control/protection: Surgical      Family History   Problem Relation Age of Onset    Heart Disease Mother     Hypertension Father     Hypertension Sister     Hypertension Brother       Past Surgical History:   Procedure Laterality Date    ENDOSCOPY, COLON, DIAGNOSTIC  7/11    normal    HX ORTHOPAEDIC      tendon repair, hand    HX TUBAL LIGATION        Past Medical History:   Diagnosis Date    DJD (degenerative joint disease) of lumbar spine 1/15/2015    Hypercholesterolemia     Hypertension     Osteoporosis     Prediabetes 9/15/2016    Viral meningitis       Current Outpatient Medications   Medication Sig Dispense Refill    Amabelz 0.5-0.1 mg per tablet       hydroCHLOROthiazide (HYDRODIURIL) 25 mg tablet TAKE 1 TABLET DAILY. m 90 Tab 1    meloxicam (MOBIC) 15 mg tablet           I have discussed the diagnosis with the patient and the intended plan as seen in the above orders. The patient has received an after-visit summary and questions were answered concerning future plans. I have discussed medication side effects and warnings with the patient as well. Follow-up and Dispositions    · Return in about 6 months (around 7/29/2021), or if symptoms worsen or fail to improve. An electronic signature was used to authenticate this note.   -- Greg Magaña MD

## 2021-01-30 LAB
25(OH)D3+25(OH)D2 SERPL-MCNC: 11.3 NG/ML (ref 30–100)
ALBUMIN SERPL-MCNC: 4.5 G/DL (ref 3.8–4.9)
ALBUMIN/GLOB SERPL: 1.8 {RATIO} (ref 1.2–2.2)
ALP SERPL-CCNC: 73 IU/L (ref 39–117)
ALT SERPL-CCNC: 8 IU/L (ref 0–32)
AST SERPL-CCNC: 15 IU/L (ref 0–40)
BILIRUB SERPL-MCNC: 0.4 MG/DL (ref 0–1.2)
BUN SERPL-MCNC: 10 MG/DL (ref 6–24)
BUN/CREAT SERPL: 16 (ref 9–23)
CALCIUM SERPL-MCNC: 9.4 MG/DL (ref 8.7–10.2)
CHLORIDE SERPL-SCNC: 101 MMOL/L (ref 96–106)
CHOLEST SERPL-MCNC: 199 MG/DL (ref 100–199)
CO2 SERPL-SCNC: 28 MMOL/L (ref 20–29)
CREAT SERPL-MCNC: 0.63 MG/DL (ref 0.57–1)
ERYTHROCYTE [DISTWIDTH] IN BLOOD BY AUTOMATED COUNT: 13.6 % (ref 11.7–15.4)
EST. AVERAGE GLUCOSE BLD GHB EST-MCNC: 126 MG/DL
GLOBULIN SER CALC-MCNC: 2.5 G/DL (ref 1.5–4.5)
GLUCOSE SERPL-MCNC: 87 MG/DL (ref 65–99)
HBA1C MFR BLD: 6 % (ref 4.8–5.6)
HCT VFR BLD AUTO: 43.3 % (ref 34–46.6)
HDLC SERPL-MCNC: 64 MG/DL
HGB BLD-MCNC: 14.3 G/DL (ref 11.1–15.9)
LDLC SERPL CALC-MCNC: 127 MG/DL (ref 0–99)
MCH RBC QN AUTO: 27 PG (ref 26.6–33)
MCHC RBC AUTO-ENTMCNC: 33 G/DL (ref 31.5–35.7)
MCV RBC AUTO: 82 FL (ref 79–97)
PLATELET # BLD AUTO: 311 X10E3/UL (ref 150–450)
POTASSIUM SERPL-SCNC: 3.5 MMOL/L (ref 3.5–5.2)
PROT SERPL-MCNC: 7 G/DL (ref 6–8.5)
RBC # BLD AUTO: 5.29 X10E6/UL (ref 3.77–5.28)
SODIUM SERPL-SCNC: 141 MMOL/L (ref 134–144)
TRIGL SERPL-MCNC: 41 MG/DL (ref 0–149)
TSH SERPL DL<=0.005 MIU/L-ACNC: 0.52 UIU/ML (ref 0.45–4.5)
VLDLC SERPL CALC-MCNC: 8 MG/DL (ref 5–40)
WBC # BLD AUTO: 3.9 X10E3/UL (ref 3.4–10.8)

## 2021-02-02 DIAGNOSIS — E55.9 VITAMIN D DEFICIENCY: Primary | ICD-10-CM

## 2021-02-02 RX ORDER — ASPIRIN 325 MG
50000 TABLET, DELAYED RELEASE (ENTERIC COATED) ORAL
Qty: 8 CAP | Refills: 0 | Status: SHIPPED | OUTPATIENT
Start: 2021-02-02 | End: 2021-03-24

## 2021-03-09 DIAGNOSIS — I10 ESSENTIAL HYPERTENSION: ICD-10-CM

## 2021-03-10 RX ORDER — HYDROCHLOROTHIAZIDE 25 MG/1
TABLET ORAL
Qty: 90 TAB | Refills: 1 | Status: SHIPPED | OUTPATIENT
Start: 2021-03-10 | End: 2021-10-08

## 2021-10-06 DIAGNOSIS — I10 ESSENTIAL HYPERTENSION: ICD-10-CM

## 2021-10-08 RX ORDER — HYDROCHLOROTHIAZIDE 25 MG/1
TABLET ORAL
Qty: 90 TABLET | Refills: 1 | Status: SHIPPED | OUTPATIENT
Start: 2021-10-08 | End: 2022-04-05 | Stop reason: ALTCHOICE

## 2021-11-30 ENCOUNTER — TRANSCRIBE ORDER (OUTPATIENT)
Dept: SCHEDULING | Age: 61
End: 2021-11-30

## 2021-11-30 DIAGNOSIS — Z12.31 SCREENING MAMMOGRAM FOR HIGH-RISK PATIENT: Primary | ICD-10-CM

## 2022-01-04 NOTE — TELEPHONE ENCOUNTER
[address] [address]  Phone: [phone]  Fax: [phone]          January 4, 2022     Patient: Ryan Andrews   YOB: 1975   Date of Visit: 1/4/2022       To Whom it May Concern:    Lo Gutierrez was seen in my clinic on 1/4/2022. She may return to work on 1/5/22. If you have any questions or concerns, please don't hesitate to call.     Sincerely,         Adrian Moore, APRN - CNP Message given to CHRIS PINEDO to schedule appt with Dr Preeti Xie.

## 2022-02-09 ENCOUNTER — HOSPITAL ENCOUNTER (OUTPATIENT)
Dept: MAMMOGRAPHY | Age: 62
Discharge: HOME | End: 2022-02-09
Attending: FAMILY MEDICINE
Payer: COMMERCIAL

## 2022-02-09 DIAGNOSIS — Z12.31 SCREENING MAMMOGRAM FOR HIGH-RISK PATIENT: ICD-10-CM

## 2022-02-09 PROCEDURE — 77067 SCR MAMMO BI INCL CAD: CPT

## 2022-03-19 PROBLEM — Z92.89 HISTORY OF ENDOMETRIAL BIOPSY: Status: ACTIVE | Noted: 2017-08-29

## 2022-04-05 ENCOUNTER — OFFICE VISIT (OUTPATIENT)
Dept: FAMILY MEDICINE CLINIC | Age: 62
End: 2022-04-05
Payer: COMMERCIAL

## 2022-04-05 VITALS
HEIGHT: 64 IN | TEMPERATURE: 97.3 F | WEIGHT: 163 LBS | SYSTOLIC BLOOD PRESSURE: 144 MMHG | BODY MASS INDEX: 27.83 KG/M2 | RESPIRATION RATE: 16 BRPM | HEART RATE: 79 BPM | OXYGEN SATURATION: 99 % | DIASTOLIC BLOOD PRESSURE: 91 MMHG

## 2022-04-05 DIAGNOSIS — N39.3 STRESS INCONTINENCE: ICD-10-CM

## 2022-04-05 DIAGNOSIS — E55.9 VITAMIN D DEFICIENCY: ICD-10-CM

## 2022-04-05 DIAGNOSIS — I10 ESSENTIAL HYPERTENSION: ICD-10-CM

## 2022-04-05 DIAGNOSIS — R73.03 PREDIABETES: ICD-10-CM

## 2022-04-05 DIAGNOSIS — Z00.00 WELL ADULT EXAM: Primary | ICD-10-CM

## 2022-04-05 DIAGNOSIS — E78.00 HYPERCHOLESTEROLEMIA: ICD-10-CM

## 2022-04-05 DIAGNOSIS — N95.1 MENOPAUSAL SYMPTOMS: ICD-10-CM

## 2022-04-05 PROCEDURE — 99396 PREV VISIT EST AGE 40-64: CPT | Performed by: FAMILY MEDICINE

## 2022-04-05 RX ORDER — LOSARTAN POTASSIUM AND HYDROCHLOROTHIAZIDE 12.5; 5 MG/1; MG/1
1 TABLET ORAL DAILY
Qty: 90 TABLET | Refills: 1 | Status: SHIPPED | OUTPATIENT
Start: 2022-04-05 | End: 2022-09-27 | Stop reason: SDUPTHER

## 2022-04-05 RX ORDER — ESTRADIOL AND NORETHINDRONE ACETATE .5; .1 MG/1; MG/1
TABLET ORAL
COMMUNITY

## 2022-04-05 NOTE — PROGRESS NOTES
Siddharth Acuña (: 1960) is a 62 y.o. female, established patient, here for evaluation of the following chief complaint(s):  Complete Physical (Annual)       ASSESSMENT/PLAN:Doing well overall. Up-to-date on routine screenings. BP running slightly high so switching to losartan/HCTZ. Also reviewed ongoing neck pain with episodic radiation to occipital scalp most consistent with cervical DDD. Encouraged Voltaren gel, heat, stretching. To follow-up if symptoms worsen  Below is the assessment and plan developed based on review of pertinent history, physical exam, labs, studies, and medications. 1. Well adult exam  -     HEMOGLOBIN A1C WITH EAG; Future  -     LIPID PANEL; Future  -     METABOLIC PANEL, COMPREHENSIVE; Future  -     TSH 3RD GENERATION; Future  -     CBC W/O DIFF; Future  -     URINALYSIS W/ RFLX MICROSCOPIC; Future  -     VITAMIN D, 25 HYDROXY; Future  2. Essential hypertension  -     METABOLIC PANEL, COMPREHENSIVE; Future  -     URINALYSIS W/ RFLX MICROSCOPIC; Future  -     losartan-hydroCHLOROthiazide (HYZAAR) 50-12.5 mg per tablet; Take 1 Tablet by mouth daily. Replaces previous hydrochlorothiazide prescription, Normal, Disp-90 Tablet, R-1  3. Hypercholesterolemia  -     HEMOGLOBIN A1C WITH EAG; Future  -     LIPID PANEL; Future  -     METABOLIC PANEL, COMPREHENSIVE; Future  -     TSH 3RD GENERATION; Future  4. Prediabetes  -     HEMOGLOBIN A1C WITH EAG; Future  5. Vitamin D deficiency  -     VITAMIN D, 25 HYDROXY; Future  6. Menopausal symptoms  7. Stress incontinence      Return in about 6 months (around 10/5/2022), or if symptoms worsen or fail to improve, for blood pressure check. SUBJECTIVE/OBJECTIVE:  62 yr old AAF with PMH sig for HTN, HLD, preDM, and Vit D def. UTD on colonoscopy and paps. Colonoscopy 2021 with Dr. Lesley Jeffery. Last mammogram was 2022 and was normal.   seeing Dr. Dean Fernandez for OB/GYN.   Previously noted to have osteopenia but most recent DEXA scan in 11/2018 was normal.    Hyperlipidemia & HTN  Pt is doing well on current meds with no medication side effects noted. Does not check BP at home. No new myalgias, no joint pains, no weakness  No TIA's, no chest pain on exertion, no dyspnea on exertion, no swelling of ankles.   Exercising - Minimal, walks at work  Dieting - No  Smoking - No     Lab Results   Component Value Date/Time    Cholesterol, total 199 01/29/2021 01:11 PM    HDL Cholesterol 64 01/29/2021 01:11 PM    LDL, calculated 127 (H) 01/29/2021 01:11 PM    LDL, calculated 110 (H) 10/01/2019 11:07 AM    Triglyceride 41 01/29/2021 01:11 PM     ROS:  Constitutional: negative except for fevers, chills and fatigue  Eyes: negative for visual disturbance  Ears, nose, mouth, throat, and face: negative for hearing loss and sore throat  Respiratory: negative for cough or dyspnea on exertion  Cardiovascular: negative for chest pain, dyspnea, palpitations, fatigue  Gastrointestinal: negative for nausea, vomiting, change in bowel habits, diarrhea and abdominal pain  Genitourinary:negative for frequency and dysuria  Skin-no rashes  Hematologic/lymphatic: negative for easy bruising and bleeding  Musculoskeletal:negative for myalgias and muscle weakness  Neurological: negative for headaches and dizziness  Behavioral/Psych: negative for anxiety and depression    Objective:     Vitals:    04/05/22 0836   BP: (!) 144/91   Pulse: 79   Resp: 16   Temp: 97.3 °F (36.3 °C)   TempSrc: Temporal   SpO2: 99%   Weight: 163 lb (73.9 kg)   Height: 5' 4\" (1.626 m)     Physical Examination:  General appearance - alert, well appearing, and in no distress  Eyes - sclera anicteric, extraocular eye movements intact  Neck - supple, no significant adenopathy  Lymphatics - no palpable lymphadenopathy, no hepatosplenomegaly  Chest - clear to auscultation, no wheezes, rales or rhonchi, symmetric air entry  Heart - normal rate, regular rhythm, normal S1, S2, no murmurs, rubs, clicks or gallops  Abdomen - soft, nontender, nondistended, no masses or organomegaly  Breasts & Pelvic - exam declined by the patient as done by OB/GYN  Neurological - alert, oriented, normal speech, no focal findings or movement disorder noted  Musculoskeletal - no joint tenderness, deformity or swelling  Extremities -no edema  Psych-normal mood and affect    Allergies   Allergen Reactions    Codeine Unknown (comments)     Simply avoids it      Social History     Socioeconomic History    Marital status:    Tobacco Use    Smoking status: Never Smoker    Smokeless tobacco: Never Used   Vaping Use    Vaping Use: Never used   Substance and Sexual Activity    Alcohol use: No    Drug use: No    Sexual activity: Yes     Partners: Male     Birth control/protection: Surgical      Family History   Problem Relation Age of Onset    Heart Disease Mother     Hypertension Father     Hypertension Sister     Hypertension Brother       Past Surgical History:   Procedure Laterality Date    ENDOSCOPY, COLON, DIAGNOSTIC  7/11    normal    HX ORTHOPAEDIC      tendon repair, hand    HX TUBAL LIGATION        Past Medical History:   Diagnosis Date    DJD (degenerative joint disease) of lumbar spine 1/15/2015    Hypercholesterolemia     Hypertension     Osteoporosis     Prediabetes 9/15/2016    Viral meningitis       Current Outpatient Medications   Medication Sig Dispense Refill    estradiol-norethindrone (Amabelz) 0.5-0.1 mg per tablet Amabelz 0.5 mg-0.1 mg tablet   Take 1 tablet every day by oral route.  losartan-hydroCHLOROthiazide (HYZAAR) 50-12.5 mg per tablet Take 1 Tablet by mouth daily. Replaces previous hydrochlorothiazide prescription 90 Tablet 1        I have discussed the diagnosis with the patient and the intended plan as seen in the above orders. The patient has received an after-visit summary and questions were answered concerning future plans.   I have discussed medication side effects and warnings with the patient as well. Follow-up and Dispositions    · Return in about 6 months (around 10/5/2022), or if symptoms worsen or fail to improve, for blood pressure check. Follow-up and Disposition History         An electronic signature was used to authenticate this note.   -- Darrel Tripp MD

## 2022-04-05 NOTE — PROGRESS NOTES
Chief Complaint   Patient presents with    Complete Physical     Annual   1. Have you been to the ER, urgent care clinic since your last visit? Hospitalized since your last visit? No    2. Have you seen or consulted any other health care providers outside of the [address] [place] [address] since your last visit? Include any pap smears or colon screening.  No

## 2022-04-12 LAB
25(OH)D3+25(OH)D2 SERPL-MCNC: 25 NG/ML (ref 30–100)
ALBUMIN SERPL-MCNC: 4.4 G/DL (ref 3.8–4.9)
ALBUMIN/GLOB SERPL: 1.6 {RATIO} (ref 1.2–2.2)
ALP SERPL-CCNC: 58 IU/L (ref 44–121)
ALT SERPL-CCNC: 12 IU/L (ref 0–32)
APPEARANCE UR: CLEAR
AST SERPL-CCNC: 17 IU/L (ref 0–40)
BILIRUB SERPL-MCNC: 0.4 MG/DL (ref 0–1.2)
BILIRUB UR QL STRIP: NEGATIVE
BUN SERPL-MCNC: 18 MG/DL (ref 8–27)
BUN/CREAT SERPL: 26 (ref 12–28)
CALCIUM SERPL-MCNC: 9.5 MG/DL (ref 8.7–10.3)
CHLORIDE SERPL-SCNC: 101 MMOL/L (ref 96–106)
CHOLEST SERPL-MCNC: 191 MG/DL (ref 100–199)
CO2 SERPL-SCNC: 22 MMOL/L (ref 20–29)
COLOR UR: YELLOW
CREAT SERPL-MCNC: 0.69 MG/DL (ref 0.57–1)
EGFR: 99 ML/MIN/1.73
ERYTHROCYTE [DISTWIDTH] IN BLOOD BY AUTOMATED COUNT: 13.9 % (ref 11.7–15.4)
EST. AVERAGE GLUCOSE BLD GHB EST-MCNC: 126 MG/DL
GLOBULIN SER CALC-MCNC: 2.8 G/DL (ref 1.5–4.5)
GLUCOSE SERPL-MCNC: 91 MG/DL (ref 65–99)
GLUCOSE UR QL STRIP: NEGATIVE
HBA1C MFR BLD: 6 % (ref 4.8–5.6)
HCT VFR BLD AUTO: 45.5 % (ref 34–46.6)
HDLC SERPL-MCNC: 65 MG/DL
HGB BLD-MCNC: 14.5 G/DL (ref 11.1–15.9)
HGB UR QL STRIP: NEGATIVE
KETONES UR QL STRIP: NEGATIVE
LDLC SERPL CALC-MCNC: 121 MG/DL (ref 0–99)
LEUKOCYTE ESTERASE UR QL STRIP: NEGATIVE
MCH RBC QN AUTO: 25.9 PG (ref 26.6–33)
MCHC RBC AUTO-ENTMCNC: 31.9 G/DL (ref 31.5–35.7)
MCV RBC AUTO: 81 FL (ref 79–97)
MICRO URNS: NORMAL
NITRITE UR QL STRIP: NEGATIVE
PH UR STRIP: 7 [PH] (ref 5–7.5)
PLATELET # BLD AUTO: 288 X10E3/UL (ref 150–450)
POTASSIUM SERPL-SCNC: 3.7 MMOL/L (ref 3.5–5.2)
PROT SERPL-MCNC: 7.2 G/DL (ref 6–8.5)
PROT UR QL STRIP: NEGATIVE
RBC # BLD AUTO: 5.59 X10E6/UL (ref 3.77–5.28)
SODIUM SERPL-SCNC: 142 MMOL/L (ref 134–144)
SP GR UR STRIP: 1.02 (ref 1–1.03)
TRIGL SERPL-MCNC: 25 MG/DL (ref 0–149)
TSH SERPL DL<=0.005 MIU/L-ACNC: 0.75 UIU/ML (ref 0.45–4.5)
UROBILINOGEN UR STRIP-MCNC: 0.2 MG/DL (ref 0.2–1)
VLDLC SERPL CALC-MCNC: 5 MG/DL (ref 5–40)
WBC # BLD AUTO: 4 X10E3/UL (ref 3.4–10.8)

## 2022-05-27 ENCOUNTER — OFFICE VISIT (OUTPATIENT)
Dept: FAMILY MEDICINE CLINIC | Age: 62
End: 2022-05-27
Payer: COMMERCIAL

## 2022-05-27 VITALS
OXYGEN SATURATION: 97 % | BODY MASS INDEX: 27.79 KG/M2 | TEMPERATURE: 97.5 F | RESPIRATION RATE: 16 BRPM | HEART RATE: 73 BPM | WEIGHT: 162.8 LBS | SYSTOLIC BLOOD PRESSURE: 137 MMHG | DIASTOLIC BLOOD PRESSURE: 85 MMHG | HEIGHT: 64 IN

## 2022-05-27 DIAGNOSIS — I10 ESSENTIAL HYPERTENSION: Primary | ICD-10-CM

## 2022-05-27 DIAGNOSIS — G44.219 EPISODIC TENSION-TYPE HEADACHE, NOT INTRACTABLE: ICD-10-CM

## 2022-05-27 PROCEDURE — 99213 OFFICE O/P EST LOW 20 MIN: CPT | Performed by: FAMILY MEDICINE

## 2022-05-27 RX ORDER — AMITRIPTYLINE HYDROCHLORIDE 25 MG/1
25 TABLET, FILM COATED ORAL
Qty: 30 TABLET | Refills: 1 | Status: SHIPPED
Start: 2022-05-27 | End: 2022-06-06 | Stop reason: SINTOL

## 2022-05-27 NOTE — PROGRESS NOTES
Saloni Wren (: 1960) is a 65 y.o. female, established patient, here for evaluation of the following chief complaint(s):  Hypertension (Elevated)       ASSESSMENT/PLAN:  BP improved with losartan/HCTZ. Encouraged ct work on diet and exercise. Trial on Elavil to help with HAs. Below is the assessment and plan developed based on review of pertinent history, physical exam, labs, studies, and medications. 1. Essential hypertension  2. Episodic tension-type headache, not intractable  -     amitriptyline (ELAVIL) 25 mg tablet; Take 1 Tablet by mouth nightly., Normal, Disp-30 Tablet, R-1      Return in about 3 months (around 2022), or if symptoms worsen or fail to improve. SUBJECTIVE/OBJECTIVE:  62 yr old AAF with PMH sig for HTN, HLD, preDM, and Vit D def. Main concern today is headaches. She notes having 78/10 aching type pain especially over the frontal scalp. Does seem to radiate all the way back to her neck. For the last 2 days this has been unilateral over the left side. Generally this is more both sides especially over the frontal scalp. She denies any specific triggers. Symptoms have been present for about a month. Seem to come and go and last a few hours. Denies any vision changes, Rucker, nausea, vomiting, photophobia, phonophobia. Does note some mild bilateral tenderness. Using ibuprofen which does seem to help with headaches. Does have some work stress but also noted having headaches while on vacation. Not drinking as much water as she should. Does endorse decreased sleep with about 6 hours total per night. She sleeps from 11 PM to 3 AM and wakes up for unclear reasons and is generally able to fall back asleep at 4 AM to wake at 7 AM.  Does endorse drinking afternoon caffeine at times. Denies any regular alcohol. Hyperlipidemia & HTN  Pt is doing well on current meds with no medication side effects noted. Does not check BP at home.   No new myalgias, no joint pains, no weakness  No TIA's, no chest pain on exertion, no dyspnea on exertion, no swelling of ankles. Exercising - Minimal, walks at work  Dieting - No  Smoking - No     Lab Results   Component Value Date/Time    Cholesterol, total 191 04/11/2022 09:47 AM    HDL Cholesterol 65 04/11/2022 09:47 AM    LDL, calculated 121 (H) 04/11/2022 09:47 AM    LDL, calculated 110 (H) 10/01/2019 11:07 AM    Triglyceride 25 04/11/2022 09:47 AM     ROS  Gen - no fever/chills  Resp - no dyspnea or cough  CV - no chest pain or MONSIVAIS  Rest per HPI    Objective:     Blood pressure 137/85, pulse 73, temperature 97.5 °F (36.4 °C), temperature source Temporal, resp. rate 16, height 5' 4\" (1.626 m), weight 162 lb 12.8 oz (73.8 kg), last menstrual period 08/08/2017, SpO2 97 %. Physical Exam  General appearance - alert, well appearing, and in no distress  Eyes -sclera anicteric  Neck - supple, no significant adenopathy, no thyromegaly  Chest - clear to auscultation, no wheezes, rales or rhonchi, symmetric air entry  Heart - normal rate, regular rhythm, normal S1, S2, no murmurs, rubs, clicks or gallops  Neurological - alert, oriented, normal speech, no focal findings or movement disorder noted  Extr - no edema  Psych - normal mood and affect    On this date 05/27/2022 I have spent 20 minutes reviewing previous notes, test results and face to face with the patient discussing the diagnosis and importance of compliance with the treatment plan as well as documenting on the day of the visit. An electronic signature was used to authenticate this note.   -- Maik Byrne MD

## 2022-05-27 NOTE — PROGRESS NOTES
Chief Complaint   Patient presents with    Hypertension     Elevated   1. Have you been to the ER, urgent care clinic since your last visit? Hospitalized since your last visit? No    2. Have you seen or consulted any other health care providers outside of the [address] [place] [address] since your last visit? Include any pap smears or colon screening.  No     Discuss headache

## 2022-05-28 ENCOUNTER — HOSPITAL ENCOUNTER (EMERGENCY)
Age: 62
Discharge: HOME | End: 2022-05-28
Attending: EMERGENCY MEDICINE
Payer: COMMERCIAL

## 2022-05-28 ENCOUNTER — APPOINTMENT (OUTPATIENT)
Dept: CT IMAGING | Age: 62
End: 2022-05-28
Attending: EMERGENCY MEDICINE
Payer: COMMERCIAL

## 2022-05-28 ENCOUNTER — APPOINTMENT (OUTPATIENT)
Dept: MRI IMAGING | Age: 62
End: 2022-05-28
Attending: EMERGENCY MEDICINE
Payer: COMMERCIAL

## 2022-05-28 ENCOUNTER — APPOINTMENT (OUTPATIENT)
Dept: GENERAL RADIOLOGY | Age: 62
End: 2022-05-28
Attending: EMERGENCY MEDICINE
Payer: COMMERCIAL

## 2022-05-28 VITALS
OXYGEN SATURATION: 100 % | BODY MASS INDEX: 28.83 KG/M2 | HEART RATE: 91 BPM | TEMPERATURE: 97.8 F | WEIGHT: 162.7 LBS | SYSTOLIC BLOOD PRESSURE: 182 MMHG | HEIGHT: 63 IN | RESPIRATION RATE: 17 BRPM | DIASTOLIC BLOOD PRESSURE: 90 MMHG

## 2022-05-28 DIAGNOSIS — R47.01 APHASIA: Primary | ICD-10-CM

## 2022-05-28 LAB
ALBUMIN SERPL-MCNC: 3.7 G/DL (ref 3.5–5)
ALBUMIN/GLOB SERPL: 0.9 {RATIO} (ref 1.1–2.2)
ALP SERPL-CCNC: 56 U/L (ref 45–117)
ALT SERPL-CCNC: 17 U/L (ref 12–78)
ANION GAP SERPL CALC-SCNC: 5 MMOL/L (ref 5–15)
APPEARANCE UR: CLEAR
AST SERPL-CCNC: 16 U/L (ref 15–37)
BACTERIA URNS QL MICRO: NEGATIVE /HPF
BASOPHILS # BLD: 0 K/UL (ref 0–0.1)
BASOPHILS NFR BLD: 1 % (ref 0–1)
BILIRUB SERPL-MCNC: 0.4 MG/DL (ref 0.2–1)
BILIRUB UR QL: NEGATIVE
BUN SERPL-MCNC: 14 MG/DL (ref 6–20)
BUN/CREAT SERPL: 17 (ref 12–20)
CALCIUM SERPL-MCNC: 8.9 MG/DL (ref 8.5–10.1)
CHLORIDE SERPL-SCNC: 107 MMOL/L (ref 97–108)
CO2 SERPL-SCNC: 27 MMOL/L (ref 21–32)
COLOR UR: ABNORMAL
COMMENT, HOLDF: NORMAL
CREAT SERPL-MCNC: 0.81 MG/DL (ref 0.55–1.02)
DIFFERENTIAL METHOD BLD: ABNORMAL
EOSINOPHIL # BLD: 0 K/UL (ref 0–0.4)
EOSINOPHIL NFR BLD: 1 % (ref 0–7)
EPITH CASTS URNS QL MICRO: ABNORMAL /LPF
ERYTHROCYTE [DISTWIDTH] IN BLOOD BY AUTOMATED COUNT: 14.3 % (ref 11.5–14.5)
GLOBULIN SER CALC-MCNC: 4.1 G/DL (ref 2–4)
GLUCOSE SERPL-MCNC: 102 MG/DL (ref 65–100)
GLUCOSE UR STRIP.AUTO-MCNC: NEGATIVE MG/DL
HCT VFR BLD AUTO: 44.7 % (ref 35–47)
HGB BLD-MCNC: 14.5 G/DL (ref 11.5–16)
HGB UR QL STRIP: NEGATIVE
IMM GRANULOCYTES # BLD AUTO: 0 K/UL (ref 0–0.04)
IMM GRANULOCYTES NFR BLD AUTO: 0 % (ref 0–0.5)
INR PPP: 1 (ref 0.9–1.1)
KETONES UR QL STRIP.AUTO: ABNORMAL MG/DL
LEUKOCYTE ESTERASE UR QL STRIP.AUTO: ABNORMAL
LYMPHOCYTES # BLD: 1.3 K/UL (ref 0.8–3.5)
LYMPHOCYTES NFR BLD: 34 % (ref 12–49)
MCH RBC QN AUTO: 26.9 PG (ref 26–34)
MCHC RBC AUTO-ENTMCNC: 32.4 G/DL (ref 30–36.5)
MCV RBC AUTO: 82.9 FL (ref 80–99)
MONOCYTES # BLD: 0.2 K/UL (ref 0–1)
MONOCYTES NFR BLD: 6 % (ref 5–13)
NEUTS SEG # BLD: 2.2 K/UL (ref 1.8–8)
NEUTS SEG NFR BLD: 58 % (ref 32–75)
NITRITE UR QL STRIP.AUTO: NEGATIVE
NRBC # BLD: 0 K/UL (ref 0–0.01)
NRBC BLD-RTO: 0 PER 100 WBC
PH UR STRIP: 7.5 [PH] (ref 5–8)
PLATELET # BLD AUTO: 301 K/UL (ref 150–400)
PMV BLD AUTO: 10.3 FL (ref 8.9–12.9)
POTASSIUM SERPL-SCNC: 3.3 MMOL/L (ref 3.5–5.1)
PROT SERPL-MCNC: 7.8 G/DL (ref 6.4–8.2)
PROT UR STRIP-MCNC: NEGATIVE MG/DL
PROTHROMBIN TIME: 10.9 SEC (ref 9–11.1)
RBC # BLD AUTO: 5.39 M/UL (ref 3.8–5.2)
RBC #/AREA URNS HPF: ABNORMAL /HPF (ref 0–5)
SAMPLES BEING HELD,HOLD: NORMAL
SODIUM SERPL-SCNC: 139 MMOL/L (ref 136–145)
SP GR UR REFRACTOMETRY: 1.02 (ref 1–1.03)
TROPONIN-HIGH SENSITIVITY: 83 NG/L (ref 0–51)
UR CULT HOLD, URHOLD: NORMAL
UROBILINOGEN UR QL STRIP.AUTO: 0.2 EU/DL (ref 0.2–1)
WBC # BLD AUTO: 3.7 K/UL (ref 3.6–11)
WBC URNS QL MICRO: ABNORMAL /HPF (ref 0–4)

## 2022-05-28 PROCEDURE — 80053 COMPREHEN METABOLIC PANEL: CPT

## 2022-05-28 PROCEDURE — 36415 COLL VENOUS BLD VENIPUNCTURE: CPT

## 2022-05-28 PROCEDURE — 85610 PROTHROMBIN TIME: CPT

## 2022-05-28 PROCEDURE — 84484 ASSAY OF TROPONIN QUANT: CPT

## 2022-05-28 PROCEDURE — 70496 CT ANGIOGRAPHY HEAD: CPT

## 2022-05-28 PROCEDURE — 70551 MRI BRAIN STEM W/O DYE: CPT

## 2022-05-28 PROCEDURE — 85025 COMPLETE CBC W/AUTO DIFF WBC: CPT

## 2022-05-28 PROCEDURE — 99285 EMERGENCY DEPT VISIT HI MDM: CPT

## 2022-05-28 PROCEDURE — 70450 CT HEAD/BRAIN W/O DYE: CPT

## 2022-05-28 PROCEDURE — 71045 X-RAY EXAM CHEST 1 VIEW: CPT

## 2022-05-28 PROCEDURE — 81001 URINALYSIS AUTO W/SCOPE: CPT

## 2022-05-28 PROCEDURE — 74011000636 HC RX REV CODE- 636: Performed by: RADIOLOGY

## 2022-05-28 PROCEDURE — 0042T CT CODE NEURO PERF W CBF: CPT

## 2022-05-28 RX ORDER — POTASSIUM CHLORIDE 750 MG/1
40 TABLET, FILM COATED, EXTENDED RELEASE ORAL
Status: DISCONTINUED | OUTPATIENT
Start: 2022-05-28 | End: 2022-05-28 | Stop reason: HOSPADM

## 2022-05-28 RX ORDER — SODIUM CHLORIDE 9 MG/ML
50 INJECTION, SOLUTION INTRAVENOUS ONCE
Status: DISCONTINUED | OUTPATIENT
Start: 2022-05-28 | End: 2022-05-28 | Stop reason: HOSPADM

## 2022-05-28 RX ORDER — LABETALOL HYDROCHLORIDE 5 MG/ML
10 INJECTION, SOLUTION INTRAVENOUS ONCE
Status: DISCONTINUED | OUTPATIENT
Start: 2022-05-28 | End: 2022-05-28

## 2022-05-28 RX ADMIN — IOPAMIDOL 40 ML: 755 INJECTION, SOLUTION INTRAVENOUS at 14:48

## 2022-05-28 RX ADMIN — IOPAMIDOL 80 ML: 755 INJECTION, SOLUTION INTRAVENOUS at 14:49

## 2022-05-28 NOTE — ADVANCED PRACTICE NURSE
Neurocritical Care Code Stroke Documentation      Symptoms:   witnessed sudden onset of aphasia and inability to walk. Witnessed by her family following a [employer] service    Resolving S/S in the ED during imaging   Last Known Well: 12:50 today   Medical hx: Past Medical History:   Diagnosis Date    DJD (degenerative joint disease) of lumbar spine 1/15/2015    Hypercholesterolemia     Hypertension     Osteoporosis     Prediabetes 9/15/2016    Viral meningitis       Anticoagulation:  none   VAN:   Negative   NIHSS:   1a-LOC: 0    1b-Month/Age:0    1c-Open/Close Hand:0    2-Best Gaze:0    3-Visual Fields:0    4-Facial Palsy:0    5a-Left Arm:0    5b-Right Arm:0    6a-Left Le    6b-Right Le    7-Limb Ataxia:0    8-Sensory:0    9-Best Language:0    10-Dysarthria:0    11-Extinction/Inattention:0  TOTAL SCORE:0   Imaging:   CT: No acute abnormality    CTA: No LVO    CTP: No mismatch   Plan:   TPA Candidate: NO    Mechanical thrombectomy Candidate: NO     *Perform dysphagia screening prior to any PO intake*    Discussed with: Dr. Jessica Moon, Dr. Stuart Sargent, Dr. Susan Valdivia time: in ED when pt arrived  Time spent: 40 minutes.      Felipe Craig NP  Neurocritical Care Nurse Practitioner  [phone]

## 2022-05-28 NOTE — ED TRIAGE NOTES
Pt was at [employer] with family members and had an episode of shaking and not talking to anyone. Pt brought in as a s/s of possible code stroke.

## 2022-05-28 NOTE — DISCHARGE INSTRUCTIONS
Return to the emergency department if your symptoms return or you develop any other symptoms you find concerning. In the meantime please follow-up with neurology as well as your primary care doctor.

## 2022-05-28 NOTE — ED PROVIDER NOTES
HPI   70-year-old female with a past medical history of hypertension, hyperlipidemia, prediabetes, and osteoporosis who presents to the emergency department as a level 1 code stroke. Patient's last known normal was 1250. According to EMS patient had a sudden onset of inability to ambulate and she could not speak. There is no report of trauma. According to EMS some bystanders stated the patient had a seizure another bystander stated she did not have a seizure. Upon EMSs arrival she could not speak. She was following commands. She had a blood glucose of over 100. Her blood pressure was elevated over 180. She is not anticoagulated. Patient seen by her PCP yesterday for headaches and was started on Elavil.     Past Medical History:   Diagnosis Date    DJD (degenerative joint disease) of lumbar spine 1/15/2015    Hypercholesterolemia     Hypertension     Osteoporosis     Prediabetes 9/15/2016    Viral meningitis        Past Surgical History:   Procedure Laterality Date    ENDOSCOPY, COLON, DIAGNOSTIC  7/11    normal    HX ORTHOPAEDIC      tendon repair, hand    HX TUBAL LIGATION           Family History:   Problem Relation Age of Onset    Heart Disease Mother     Hypertension Father     Hypertension Sister     Hypertension Brother        Social History     Socioeconomic History    Marital status:      Spouse name: Not on file    Number of children: Not on file    Years of education: Not on file    Highest education level: Not on file   Occupational History    Not on file   Tobacco Use    Smoking status: Never Smoker    Smokeless tobacco: Never Used   Vaping Use    Vaping Use: Never used   Substance and Sexual Activity    Alcohol use: No    Drug use: No    Sexual activity: Yes     Partners: Male     Birth control/protection: Surgical   Other Topics Concern    Not on file   Social History Narrative    Not on file     Social Determinants of Health     Financial Resource Strain: Low Risk     Difficulty of Paying Living Expenses: Not hard at all   Food Insecurity: No Food Insecurity    Worried About Running Out of Food in the Last Year: Never true    Guille of Food in the Last Year: Never true   Transportation Needs:     Lack of Transportation (Medical): Not on file    Lack of Transportation (Non-Medical): Not on file   Physical Activity:     Days of Exercise per Week: Not on file    Minutes of Exercise per Session: Not on file   Stress:     Feeling of Stress : Not on file   Social Connections:     Frequency of Communication with Friends and Family: Not on file    Frequency of Social Gatherings with Friends and Family: Not on file    Attends Yarsani Services: Not on file    Active Member of [address] [place] [address] or Organizations: Not on file    Attends Club or Organization Meetings: Not on file    Marital Status: Not on file   Intimate Partner Violence:     Fear of Current or Ex-Partner: Not on file    Emotionally Abused: Not on file    Physically Abused: Not on file    Sexually Abused: Not on file   Housing Stability:     Unable to Pay for Housing in the Last Year: Not on file    Number of [place] in the Last Year: Not on file    Unstable Housing in the Last Year: Not on file         ALLERGIES: Codeine    Review of Systems   Unable to perform a complete review of systems secondary to the patient being unable to speak. Vitals:    05/28/22 1425 05/28/22 1430   BP: (!) 168/98 (!) 168/98   Pulse: 78 78   Resp: 18 18   Temp: 97.8 °F (36.6 °C) 97.8 °F (36.6 °C)   SpO2: 100% 100%   Weight:  73.8 kg (162 lb 11.2 oz)   Height:  5' 3\" (1.6 m)            Physical Exam  Constitutional:       Comments: Patient is tearful. She is hyperventilating. Not diaphoretic   HENT:      Head:      Comments: No appreciable signs of head trauma     Mouth/Throat:      Comments: Moist mucous membranes. Tongue protrudes to midline  Eyes:      Comments: Pupils are 3 mm and reactive to light bilaterally. Extraocular movements appear intact   Neck:      Comments: Trachea midline. No carotid bruits  Cardiovascular:      Comments: Regular rate and rhythm without murmurs. Pulmonary:      Effort: Pulmonary effort is normal. No respiratory distress. Breath sounds: Normal breath sounds. No wheezing or rales. Abdominal:      General: There is no distension. Palpations: Abdomen is soft. Tenderness: There is no abdominal tenderness. Musculoskeletal:         General: No deformity. Normal range of motion. Skin:     General: Skin is warm and dry. Neurological:      Comments: Patient moving her head left and right and tracking with her eyes. Initially no attempts were made for the patient to speak when asked to repeat words. She was later able to sing along with nursing staff. When she does speak there is no appreciable dysarthria. She has no drift in the upper or lower extremities. NIH stroke scale of 0. MDM   27-year-old female presents with the above chief complaint. Vital signs normal here. On exam patient is tearful. She is looking left and right and moving her head as well as her eyes. She has no drift in the upper or lower extremities. She is following commands. She was able to sing along with staff. There is no facial droop. She has an NIH stroke scale of 0.  CT scans do not show any acute abnormalities. She is not a tPA candidate given her NIH stroke scale of 0. Teleneurology has seen the patient and agrees with withholding tPA at this time. EKG shows normal sinus rhythm with a rate of 91 and QTC of 482 with no findings consistent with acute ischemia. MRI shows chronic microangiopathic changes but no acute infarcts. Labs really only notable for potassium of 3.3 which is unlikely to be causing her symptoms. This will be supplemented orally. She has returned to her mental baseline.   Given her reassuring work-up and improvement she was deemed appropriate for discharge. She will be referred to neurology and she was told to follow-up with her primary care physician. She was discharged in stable condition.     Procedures

## 2022-06-06 ENCOUNTER — VIRTUAL VISIT (OUTPATIENT)
Dept: FAMILY MEDICINE CLINIC | Age: 62
End: 2022-06-06
Payer: COMMERCIAL

## 2022-06-06 DIAGNOSIS — R47.01 APHASIA: ICD-10-CM

## 2022-06-06 DIAGNOSIS — F80.81 STUTTER: ICD-10-CM

## 2022-06-06 DIAGNOSIS — E78.00 HYPERCHOLESTEROLEMIA: ICD-10-CM

## 2022-06-06 DIAGNOSIS — I10 ESSENTIAL HYPERTENSION: Primary | ICD-10-CM

## 2022-06-06 PROCEDURE — 99214 OFFICE O/P EST MOD 30 MIN: CPT | Performed by: FAMILY MEDICINE

## 2022-06-06 NOTE — PROGRESS NOTES
Chief Complaint   Patient presents with    Follow-up     ER visit   1. Have you been to the ER, urgent care clinic since your last visit? Hospitalized since your last visit? Yes Where: [facility] ER    2. Have you seen or consulted any other health care providers outside of the [address] [place] [address] since your last visit? Include any pap smears or colon screening.  No     Stopped Elavil

## 2022-06-06 NOTE — PROGRESS NOTES
Urszula Macias (: 1960) is a 65 y.o. female, established patient, here for evaluation of the following chief complaint(s):   Follow-up (ER visit)       ASSESSMENT/PLAN: Concerning events leading to ER evaluation. Suspect medication reaction to Elavil given timeframe. Still with worse stuttering symptoms and will see neurology soon. Discussed getting more aggressive with her stroke risk factors including HTN and HLD. She declines adding on a statin or starting aspirin today. She will start to monitor her blood pressure more frequently and reports her home readings have been 130s/80s. Also reviewed potentially working with her GYN Dr. Andrzej Morris to pull away from [facility] given slightly increased cardiovascular risk with combo estrogen/progesterone (though lower than estrogen alone)    Below is the assessment and plan developed based on review of pertinent history, labs, studies, and medications. 1. Essential hypertension  2. Hypercholesterolemia  3. Aphasia  4. Stutter      Return in about 4 weeks (around 2022). SUBJECTIVE/OBJECTIVE:    62 yr old AAF with PMH sig for HTN, HLD, preDM, and Vit D def who presents for f/u after ER visit    She was seen for concerns with aphasia. Drove to [employer] that day and does not remember much after this. She came to the ER by squad and had stroke eval including CT head, CTA head and neck, MRI brain. All of the evaluation came back with no concerns for stroke. She was set up with neurology and sent home. Prior to her symptoms, she started on on Elavil 25 mg for HAs. She only took 1 dose of Elavil 25 mg the night prior to her symptoms starting and has not taken another dose since. Since this ER evaluation, she reports stuttering is much worse (she did have a stutter prior to this episode but it was very mild). She reports that her HAs are much better. Feels like she is forgetting things lately. No hx of seizures. Dad's sister had CVA in 76s.     Will see Neuro on 6/23/22. Hyperlipidemia & HTN  Blood pressure medication was adjusted in 4/2022 to start on losartan/HCTZ 50/12.5 mg. At our last appointment in office, her BP was much improved at 130s/80s. No new myalgias, no joint pains, no weakness  No chest pain on exertion, no dyspnea on exertion, no swelling of ankles. Exercising - Minimal, walks at work  Dieting - No  Smoking - No     Lab Results   Component Value Date/Time    Cholesterol, total 191 04/11/2022 09:47 AM    HDL Cholesterol 65 04/11/2022 09:47 AM    LDL, calculated 121 (H) 04/11/2022 09:47 AM    LDL, calculated 110 (H) 10/01/2019 11:07 AM    Triglyceride 25 04/11/2022 09:47 AM     ROS  Gen - no fever/chills  Resp - no dyspnea or cough  CV - no chest pain or MONSIVAIS  Rest per HPI    Objective:     Patient-Reported Systolic (Top): 560  Patient-Reported Diastolic (Bottom): 98     Physical exam:  General appearance - alert, well appearing, and in no distress  Eyes -sclera anicteric, no discharge  HEENT- normocephalic, atraumatic, moist mucous membranes, no visualized neck mass  Chest -normal respiratory effort, no visualized signs of respiratory distress  Neurological - alert, awake, more frequent stuttering noted, no focal findings or movement disorder noted  Psych - normal mood and affect  Skin- no apparent lesions      On this date 06/06/2022 I have spent 30 minutes reviewing previous notes, test results and face to face (virtual) with the patient discussing the diagnosis and importance of compliance with the treatment plan as well as documenting on the day of the visit. Nelson Lainez, was evaluated through a synchronous (real-time) audio-video encounter. The patient (or guardian if applicable) is aware that this is a billable service, which includes applicable co-pays. This Virtual Visit was conducted with patient's (and/or legal guardian's) consent.  The visit was conducted pursuant to the emergency declaration under the [address] and the National Emergencies Act, [facility] waiver authority and the [place] [employer] and [employer] General Act. Patient identification was verified, and a caregiver was present when appropriate. The patient was located at: Home: [place] [id number]  The provider was located at: Home:        An electronic signature was used to authenticate this note.   -- Raghav Mark MD

## 2022-06-23 ENCOUNTER — OFFICE VISIT (OUTPATIENT)
Dept: NEUROLOGY | Age: 62
End: 2022-06-23
Payer: COMMERCIAL

## 2022-06-23 VITALS
SYSTOLIC BLOOD PRESSURE: 154 MMHG | OXYGEN SATURATION: 98 % | HEIGHT: 63 IN | WEIGHT: 163.6 LBS | RESPIRATION RATE: 18 BRPM | HEART RATE: 72 BPM | DIASTOLIC BLOOD PRESSURE: 87 MMHG | BODY MASS INDEX: 28.99 KG/M2

## 2022-06-23 DIAGNOSIS — R40.4 TRANSIENT ALTERATION OF AWARENESS: Primary | ICD-10-CM

## 2022-06-23 DIAGNOSIS — R51.9 CHRONIC DAILY HEADACHE: ICD-10-CM

## 2022-06-23 PROCEDURE — 99205 OFFICE O/P NEW HI 60 MIN: CPT | Performed by: PSYCHIATRY & NEUROLOGY

## 2022-06-23 RX ORDER — METHYLPREDNISOLONE 4 MG/1
TABLET ORAL
Qty: 1 DOSE PACK | Refills: 0 | Status: SHIPPED | OUTPATIENT
Start: 2022-06-23 | End: 2022-09-30 | Stop reason: ALTCHOICE

## 2022-06-23 NOTE — PROGRESS NOTES
RM 5    Identified pt with two pt identifiers(name and ). Reviewed record in preparation for visit and have obtained necessary documentation. Chief Complaint   Patient presents with   [facility].     ED visit for [facility] aphasia. pt is having trouble getting words out. States, is improving some. Vitals:    22 0801   BP: (!) 154/87   Pulse: 72   Resp: 18   SpO2: 98%   Weight: 163 lb 9.6 oz (74.2 kg)   Height: 5' 3\" (1.6 m)   PainSc:   0 - No pain   LMP: 2017       Health Maintenance Review: Patient reminded of \"due or due soon\" health maintenance. I have asked the patient to contact his/her primary care provider (PCP) for follow-up on his/her health maintenance. Coordination of Care Questionnaire:  :   1) Have you been to an emergency room, urgent care, or hospitalized since your last visit? If yes, where when, and reason for visit? YES  22 [facility]  Dx: aphasia  2. Have seen or consulted any other health care provider since your last visit? If yes, where when, and reason for visit? NO    Patient is accompanied by self I have received verbal consent from Nicki Herrera to discuss any/all medical information while they are present in the room.

## 2022-06-23 NOTE — PROGRESS NOTES
Chief Complaint   Patient presents with   [address]     ED visit for [facility] aphasia. pt is having trouble getting words out. States, is improving some. HISTORY OF PRESENT ILLNESS  Danni Tripp is a 65 y.o. female who came in for neurological evaluation, referred from the emergency department where she presented about 3 weeks ago. She has been complaining of a daily headache which she describes as a pressure-like sensation mainly on the left side of her head, not associated with any photophobia, phonophobia, nausea or vomiting. She has been using ibuprofen once or twice a day almost on a daily basis. She was prescribed amitriptyline 25 mg at bedtime and she took it first time on a Friday night. The next morning, she woke up, felt fine. Drove to the [employer] with her daughter and there were no issues. She was sitting in the service when she started to look somewhat off, could not respond, talk or interact with people. She also had difficulty walking when she stood up. There was some questionable reports of seizure-like activity in the extremities but there was no loss of consciousness. The nurse at the [employer] called the ambulance and she was brought into the hospital.  She does not remember any of this. When she came to, she found herself in the emergency room. She had stroke/TIA work-up which was negative and was discharged home. She has never had any similar episodes in the past.  Denies any chest pain, shortness of breath palpitations, diaphoresis. There was no tongue bite or bladder incontinence. Currently works as an  at a dental office.        Past Medical History:   Diagnosis Date    DJD (degenerative joint disease) of lumbar spine 1/15/2015    Hypercholesterolemia     Hypertension     Osteoporosis     Prediabetes 9/15/2016    Viral meningitis      Current Outpatient Medications   Medication Sig    methylPREDNISolone (MEDROL DOSEPACK) 4 mg tablet Chr daily headache  estradiol-norethindrone (Amabelz) 0.5-0.1 mg per tablet Amabelz 0.5 mg-0.1 mg tablet   Take 1 tablet every day by oral route.  losartan-hydroCHLOROthiazide (HYZAAR) 50-12.5 mg per tablet Take 1 Tablet by mouth daily. Replaces previous hydrochlorothiazide prescription     No current facility-administered medications for this visit. Allergies   Allergen Reactions    Codeine Unknown (comments)     Simply avoids it     Family History   Problem Relation Age of Onset    Heart Disease Mother     Hypertension Father     Hypertension Sister     Hypertension Brother      Social History     Tobacco Use    Smoking status: Never Smoker    Smokeless tobacco: Never Used   Vaping Use    Vaping Use: Never used   Substance Use Topics    Alcohol use: No    Drug use: No     Past Surgical History:   Procedure Laterality Date    ENDOSCOPY, COLON, DIAGNOSTIC  7/11    normal    HX ORTHOPAEDIC      tendon repair, hand    HX TUBAL LIGATION           REVIEW OF SYSTEMS  Review of Systems - History obtained from the patient  Psychological ROS: negative  ENT ROS: negative  Hematological and Lymphatic ROS: negative  Endocrine ROS: negative  Respiratory ROS: no cough, shortness of breath, or wheezing  Cardiovascular ROS: no chest pain or dyspnea on exertion  Gastrointestinal ROS: no abdominal pain, change in bowel habits, or black or bloody stools  Genito-Urinary ROS: no dysuria, trouble voiding, or hematuria  Musculoskeletal ROS: negative  Dermatological ROS: negative      PHYSICAL EXAMINATION:    Visit Vitals  BP (!) 154/87 (BP 1 Location: Left upper arm, BP Patient Position: Sitting, BP Cuff Size: Adult)   Pulse 72   Resp 18   Ht 5' 3\" (1.6 m)   Wt 163 lb 9.6 oz (74.2 kg)   SpO2 98%   BMI 28.98 kg/m²     General:  Well groomed individual in no acute distress. Neck: Supple, nontender, no bruits, no pain with resistance to active range of motion. Heart: Regular rate and rhythm. Normal S1S2.   Lungs:  Equal chest expansion, no cough, no wheeze  Musculoskeletal:  Extremities revealed no edema and had full range of motion of joints. Psych:  Good mood and bright affect    NEUROLOGICAL EXAMINATION:     Mental Status:   Alert and oriented to person, place, and time with recent and remote memory intact. Attention span and concentration are normal. Speech is fluent. Cranial Nerves:    II, III, IV, VI:  Visual acuity grossly intact. Visual fields are normal.    Pupils are equal, round, and reactive to light. Extra-ocular movements are full and fluid. Fundoscopic exam was benign, no ptosis or nystagmus. V-XII: Hearing is grossly intact. Facial features are symmetric, with normal sensation and strength. The palate rises symmetrically and the tongue protrudes midline. Sternocleidomastoids 5/5. Motor Examination: Normal tone, bulk, and strength. 5/5 muscle strength throughout. No cogwheel rigidity or clonus present. Sensory exam:  Normal throughout to pinprick, temperature, and vibration sense. Normal proprioception. Coordination:  Finger to nose and rapid arm movement testing was normal.   No resting or intention tremor    Gait and Station:  Steady while walking on toes, heels, and with tandem walking. Normal arm swing. No Rhomberg or pronator drift. No muscle wasting or fasiculations noted. Reflexes:  DTRs 2+ throughout. Toes downgoing.         LABS / IMAGING  Lab Results   Component Value Date/Time    WBC 3.7 05/28/2022 03:00 PM    HGB 14.5 05/28/2022 03:00 PM    HCT 44.7 05/28/2022 03:00 PM    PLATELET 620 58/81/1621 03:00 PM    MCV 82.9 05/28/2022 03:00 PM     Lab Results   Component Value Date/Time    Sodium 139 05/28/2022 03:00 PM    Potassium 3.3 (L) 05/28/2022 03:00 PM    Chloride 107 05/28/2022 03:00 PM    CO2 27 05/28/2022 03:00 PM    Anion gap 5 05/28/2022 03:00 PM    Glucose 102 (H) 05/28/2022 03:00 PM    BUN 14 05/28/2022 03:00 PM    Creatinine 0.81 05/28/2022 03:00 PM BUN/Creatinine ratio 17 05/28/2022 03:00 PM    GFR est AA >60 05/28/2022 03:00 PM    GFR est non-AA >60 05/28/2022 03:00 PM    Calcium 8.9 05/28/2022 03:00 PM    Bilirubin, total 0.4 05/28/2022 03:00 PM    Alk. phosphatase 56 05/28/2022 03:00 PM    Protein, total 7.8 05/28/2022 03:00 PM    Albumin 3.7 05/28/2022 03:00 PM    Globulin 4.1 (H) 05/28/2022 03:00 PM    A-G Ratio 0.9 (L) 05/28/2022 03:00 PM    ALT (SGPT) 17 05/28/2022 03:00 PM    AST (SGOT) 16 05/28/2022 03:00 PM     MRI Results (most recent):  Results from [place] encounter on 05/28/22    MRI CODE NEURO BRAIN WO CONT    Narrative  EXAM: MRI Brain without contrast.    INDICATION:    Aphasia. SEQUENCES:   Sagittal T1, axial T1, T2, GRE and FLAIR; Cor T2; and diffusion  imaging of the brain. No contrast.    FINDINGS:  The brain parenchyma demonstrates scattered foci of bilateral deep cerebral  white matter T2 hyperintensity without diffusion restriction favoring chronic  microangiopathy. There is no evidence for mass, acute hemorrhage, shift, or obstructive  hydrocephalus. There is no significant extra-axial fluid collection. There is no diffusion restriction --no acute infarct/ischemia. Craniocervical junction is unremarkable. Appropriate flow voids are present in the visualized vertebrobasilar and carotid  arteries. Impression  No acute finding. Chronic microangiopathy. Imaging reviewed    CT Results (most recent):  Results from Hospital Encounter encounter on 05/28/22    CT CODE NEURO PERF W CBF    Narrative  EXAM:  CTA CODE NEURO HEAD AND NECK W CONT, CT CODE NEURO PERF W CBF    INDICATION:   Code Stroke    COMPARISON:  CT head 5/28/2022. CONTRAST:  120 mL of Isovue-370. TECHNIQUE:  Unenhanced  images were obtained to localize the volume for  acquisition.   Multislice helical axial CT angiography was performed from the  aortic arch to the top of the head during uneventful rapid bolus intravenous  contrast administration. Coronal and sagittal reformations and 3D post  processing was performed. CT dose reduction was achieved through use of a  standardized protocol tailored for this examination and automatic exposure  control for dose modulation. CT brain perfusion was performed with generation of hemodynamic maps of multiple  parameters, including cerebral blood flow, cerebral blood volume, and MTT (mean  transit time). CT dose reduction was achieved through use of a standardized  protocol tailored for this examination and automatic exposure control for dose  modulation. This study was analyzed by the [address]  algorithm. FINDINGS:    CTA Head:  There is no evidence of large vessel occlusion or flow-limiting stenosis of the  intracranial internal carotid, anterior cerebral, and middle cerebral arteries. The anterior communicating artery is patent with small left A1 segment. There is no evidence of large vessel occlusion or flow-limiting stenosis of the  intracranial vertebral arteries, basilar artery, or posterior cerebral arteries. The right posterior communicating artery is patent, the left is not seen. There is no evidence of aneurysm or vascular malformation. The dural venous  sinuses and deep cerebral venous system are patent. No evidence of abnormal  enhancement on delayed phase images. CTA NECK:  NASCET method was utilized for calculating stenosis. The aortic arch is unremarkable. The common carotid arteries demonstrate no  significant stenosis. There is no evidence of significant stenosis in the  cervical right internal carotid artery. There is no evidence of significant  stenosis in the cervical left internal carotid artery. There is a right dominant vertebrobasilar arterial system. The cervical  vertebral arteries are normal in course, size and contour without significant  stenosis. Visualized soft tissues of the neck are unremarkable. Visualized lung apices are  clear.  No acute fracture or aggressive osseous lesion. Multilevel degenerative  disc disease in the cervical spine most advanced at C5-C6 and C6-C7. CT Brain Perfusion:  Unreliable perfusion maps due to the degree of motion. There are no regional  areas of elevated Tmax, decreased cerebral blood flow or blood volume. rCBF < 30% = 0 cc. Tmax > 6 seconds = 0 cc. Impression  CTA Head:  1. No evidence of significant stenosis or aneurysm. CTA Neck:  1. No evidence of significant stenosis. CT Brain Perfusion:  1. Unreliable perfusion maps due to the degree of motion. No acute abnormality. Lab Results   Component Value Date/Time    Cholesterol, total 191 04/11/2022 09:47 AM    HDL Cholesterol 65 04/11/2022 09:47 AM    LDL, calculated 121 (H) 04/11/2022 09:47 AM    LDL, calculated 110 (H) 10/01/2019 11:07 AM    VLDL, calculated 5 04/11/2022 09:47 AM    VLDL, calculated 6 10/01/2019 11:07 AM    Triglyceride 25 04/11/2022 09:47 AM    CHOL/HDL Ratio 2.4 10/11/2010 10:15 AM         ASSESSMENT    ICD-10-CM ICD-9-CM    1. Transient alteration of awareness  R40.4 780.02 EEG      ECHO ADULT COMPLETE   2. Chronic daily headache  R51.9 784.0 methylPREDNISolone (MEDROL DOSEPACK) 4 mg tablet       DISCUSSION  Ms. Hector Ceja had an episode where she had difficulty expressing herself, appeared out of it, was not able to walk with some questionable reports of seizure-like activity which is not confirmed by patient or her family. She has no memory of the event.    We discussed potential possibilities including posterior circulation TIA, lingering effects of new medication that she took for the first time i.e. amitriptyline, partial complex seizure or migraine  She had MRI scan of the brain which was negative except for small vessel ischemic changes  I have recommended checking an echocardiogram to complete her stroke work-up  Also check an EEG to evaluate baseline electrocerebral activities  She has been complaining of daily headache for the past 3 months or so and has been taking ibuprofen on a daily basis  We discussed analgesic overuse and she should discontinue daily use of ibuprofen. Short course of steroid was given to break her headache cycle. If the headaches persist, we may need to consider an alternative prophylactic.   Reasonable to start low-dose aspirin daily  I will review the above test once completed    Mae Pacheco MD  Diplomate, American Board of Psychiatry & Neurology (Neurology)  [employer] (Clinical Neurophysiology)  Diplomate, American Board of Electrodiagnostic Medicine

## 2022-09-26 DIAGNOSIS — I10 ESSENTIAL HYPERTENSION: ICD-10-CM

## 2022-09-27 RX ORDER — LOSARTAN POTASSIUM AND HYDROCHLOROTHIAZIDE 12.5; 5 MG/1; MG/1
TABLET ORAL
Qty: 90 TABLET | Refills: 1 | Status: SHIPPED | OUTPATIENT
Start: 2022-09-27

## 2022-09-30 ENCOUNTER — OFFICE VISIT (OUTPATIENT)
Dept: FAMILY MEDICINE CLINIC | Age: 62
End: 2022-09-30
Payer: COMMERCIAL

## 2022-09-30 VITALS
WEIGHT: 165.2 LBS | HEIGHT: 63 IN | BODY MASS INDEX: 29.27 KG/M2 | RESPIRATION RATE: 16 BRPM | DIASTOLIC BLOOD PRESSURE: 90 MMHG | TEMPERATURE: 97.1 F | SYSTOLIC BLOOD PRESSURE: 149 MMHG | HEART RATE: 76 BPM | OXYGEN SATURATION: 100 %

## 2022-09-30 DIAGNOSIS — Z79.899 ENCOUNTER FOR LONG-TERM (CURRENT) USE OF MEDICATIONS: ICD-10-CM

## 2022-09-30 DIAGNOSIS — R47.01 APHASIA: ICD-10-CM

## 2022-09-30 DIAGNOSIS — R73.03 PREDIABETES: ICD-10-CM

## 2022-09-30 DIAGNOSIS — E78.00 HYPERCHOLESTEROLEMIA: ICD-10-CM

## 2022-09-30 DIAGNOSIS — F80.81 STUTTER: ICD-10-CM

## 2022-09-30 DIAGNOSIS — I10 ESSENTIAL HYPERTENSION: Primary | ICD-10-CM

## 2022-09-30 PROCEDURE — 99214 OFFICE O/P EST MOD 30 MIN: CPT | Performed by: FAMILY MEDICINE

## 2022-09-30 RX ORDER — AMLODIPINE BESYLATE 5 MG/1
5 TABLET ORAL DAILY
Qty: 90 TABLET | Refills: 0 | Status: SHIPPED | OUTPATIENT
Start: 2022-09-30

## 2022-09-30 RX ORDER — ASPIRIN 81 MG/1
81 TABLET ORAL DAILY
Qty: 90 TABLET | Refills: 3 | Status: SHIPPED | OUTPATIENT
Start: 2022-09-30

## 2022-09-30 RX ORDER — ROSUVASTATIN CALCIUM 5 MG/1
5 TABLET, COATED ORAL
Qty: 90 TABLET | Refills: 1 | Status: SHIPPED | OUTPATIENT
Start: 2022-09-30

## 2022-09-30 NOTE — PROGRESS NOTES
Chief Complaint   Patient presents with    Hypertension     Follow-up    1. Have you been to the ER, urgent care clinic since your last visit? Hospitalized since your last visit? No    2. Have you seen or consulted any other health care providers outside of the [address] [place] [address] since your last visit? Include any pap smears or colon screening.  No

## 2022-09-30 NOTE — PATIENT INSTRUCTIONS
As we discussed in further detail today, I would like for us to be more aggressive with your blood pressure and cholesterol so we are making the following changes:    1. Continue taking losartan/HCTZ and we are adding on amlodipine 5 mg daily   2. We are starting daily Aspirin 81 mg daily  3. We are starting low dose Crestor 5 mg nightly to help with cholesterol    Lastly, please discuss with Dr. Shahid Clarke if your hormone medication is still needed.

## 2022-09-30 NOTE — PROGRESS NOTES
Melissa Johnson (: 1960) is a 65 y.o. female, established patient, here for evaluation of the following chief complaint(s):  Hypertension (Follow-up)       ASSESSMENT/PLAN:  Diagnoses and all orders for this visit:    1. Essential hypertension - BP too high. Adding amlodipine  -     amLODIPine (NORVASC) 5 mg tablet; Take 1 Tablet by mouth daily.  -     METABOLIC PANEL, COMPREHENSIVE; Future    2. Hypercholesterolemia - with concerns for TIA, discussed starting statin and ASA prev. She is willing today. To recheck labs in 6 weeks. -     aspirin delayed-release 81 mg tablet; Take 1 Tablet by mouth daily. -     rosuvastatin (CRESTOR) 5 mg tablet; Take 1 Tablet by mouth nightly.  -     HEMOGLOBIN A1C WITH EAG; Future  -     LIPID PANEL; Future  -     METABOLIC PANEL, COMPREHENSIVE; Future  -     TSH 3RD GENERATION; Future    3. Aphasia  4. Stutter  - largely improved to baseline, still suspect med rxn to Elavil but RFs for TIA so treating RFs more aggressively as above. 5. Prediabetes - rechecking labs  -     HEMOGLOBIN A1C WITH EAG; Future    6. Encounter for long-term (current) use of medications  -     HEMOGLOBIN A1C WITH EAG; Future  -     LIPID PANEL; Future  -     METABOLIC PANEL, COMPREHENSIVE; Future  -     TSH 3RD GENERATION; Future    Return in about 3 months (around 2022). SUBJECTIVE:  62 yr old AAF with PMH sig for HTN, HLD, preDM, and Vit D def here for to follow on aphasia and stutter sx. Since this ER evaluation, she reports stuttering is largely improved but still noting when upset/tired (she did have a stutter prior to this episode that was mild and things are back to this baseline). She reports that her HAs are much better. Forgetfulness is improved to baseline. No hx of seizures. Dad's sister had CVA in 76s. Reviewed hx again after last appt: She was seen in ER for concerns with aphasia. Drove to [employer] that day and does not remember much after this.   She came to the ER by cinthya and had stroke eval including CT head, CTA head and neck, MRI brain. All of the evaluation came back with no concerns for stroke. She was set up with neurology and sent home. Prior to her symptoms, she started on on Elavil 25 mg for HAs. She only took 1 dose of Elavil 25 mg the night before her symptoms started and has not taken another dose since. Based on hx, I suspected this was a medication reaction to Elavil given timeframe vs. TIA. She reports improvement in her sx today and has seen neuro - Dr. Jon Polo in 6/2022. Notes reviewed and further work up with Echo and EEG were ordered but have not been done. She was also rec to stop ibuprofen and put on steroids to stop HA cycle. HAs improving overall. We prev discussed getting more aggressive with her stroke risk factors including HTN and HLD. She declined adding on a statin or starting aspirin at last appt and we are revisiting these issues today. Home BP has been 130s/80s. At last appt, we also reviewed discussing with Dr. Gabriel Siu (GYN) if there would be benefit from stopping Amabelz given slightly increased cardiovascular risk with combo estrogen/progesterone (although this is still lower than estrogen alone). Hyperlipidemia & HTN  Blood pressure medication was adjusted in 4/2022 to start on losartan/HCTZ 50/12.5 mg. At our last appointment in office, her BP was much improved at 130s/80s. No new myalgias, no joint pains, no weakness  No chest pain on exertion, no dyspnea on exertion, no swelling of ankles.   Exercising - Minimal, walks at work  Dieting - No  Smoking - No    Lab Results   Component Value Date/Time    Cholesterol, total 191 04/11/2022 09:47 AM    HDL Cholesterol 65 04/11/2022 09:47 AM    LDL, calculated 121 (H) 04/11/2022 09:47 AM    LDL, calculated 110 (H) 10/01/2019 11:07 AM    Triglyceride 25 04/11/2022 09:47 AM     ROS  Gen - no fever/chills  Resp - no dyspnea or cough  CV - no chest pain or MONSIVAIS  Rest per HPI    Objective:     Blood pressure (!) 149/90, pulse 76, temperature 97.1 °F (36.2 °C), temperature source Temporal, resp. rate 16, height 5' 3\" (1.6 m), weight 165 lb 3.2 oz (74.9 kg), last menstrual period 08/08/2017, SpO2 100 %. Physical Exam  General appearance - alert, well appearing, and in no distress  Eyes -sclera anicteric  Neck - supple, no significant adenopathy, no thyromegaly  Chest - clear to auscultation, no wheezes, rales or rhonchi, symmetric air entry  Heart - normal rate, regular rhythm, normal S1, S2, no murmurs, rubs, clicks or gallops  Neurological - alert, oriented, normal speech, no focal findings or movement disorder noted  Extr - no edema  Psych - normal mood and affect    An electronic signature was used to authenticate this note.   -- Natalia Pond MD

## 2022-10-24 ENCOUNTER — HOSPITAL ENCOUNTER (OUTPATIENT)
Dept: NON INVASIVE DIAGNOSTICS | Age: 62
Discharge: HOME | End: 2022-10-24
Attending: PSYCHIATRY & NEUROLOGY
Payer: COMMERCIAL

## 2022-10-24 VITALS
SYSTOLIC BLOOD PRESSURE: 151 MMHG | BODY MASS INDEX: 28.91 KG/M2 | DIASTOLIC BLOOD PRESSURE: 91 MMHG | WEIGHT: 163.14 LBS | HEIGHT: 63 IN

## 2022-10-24 DIAGNOSIS — R40.4 TRANSIENT ALTERATION OF AWARENESS: ICD-10-CM

## 2022-10-24 LAB
ECHO AO ROOT DIAM: 3.4 CM
ECHO AO ROOT INDEX: 1.92 CM/M2
ECHO AV PEAK GRADIENT: 5 MMHG
ECHO AV PEAK VELOCITY: 1.1 M/S
ECHO AV VELOCITY RATIO: 0.91
ECHO EST RA PRESSURE: 3 MMHG
ECHO LA DIAMETER INDEX: 1.69 CM/M2
ECHO LA DIAMETER: 3 CM
ECHO LA TO AORTIC ROOT RATIO: 0.88
ECHO LV E' LATERAL VELOCITY: 9 CM/S
ECHO LV E' SEPTAL VELOCITY: 5 CM/S
ECHO LV FRACTIONAL SHORTENING: 36 % (ref 28–44)
ECHO LV INTERNAL DIMENSION DIASTOLE INDEX: 2.03 CM/M2
ECHO LV INTERNAL DIMENSION DIASTOLIC: 3.6 CM (ref 3.9–5.3)
ECHO LV INTERNAL DIMENSION SYSTOLIC INDEX: 1.3 CM/M2
ECHO LV INTERNAL DIMENSION SYSTOLIC: 2.3 CM
ECHO LV IVSD: 1.1 CM (ref 0.6–0.9)
ECHO LV MASS 2D: 100.2 G (ref 67–162)
ECHO LV MASS INDEX 2D: 56.6 G/M2 (ref 43–95)
ECHO LV POSTERIOR WALL DIASTOLIC: 0.8 CM (ref 0.6–0.9)
ECHO LV RELATIVE WALL THICKNESS RATIO: 0.44
ECHO LVOT PEAK GRADIENT: 4 MMHG
ECHO LVOT PEAK VELOCITY: 1 M/S
ECHO MV A VELOCITY: 0.88 M/S
ECHO MV AREA PHT: 3.8 CM2
ECHO MV E DECELERATION TIME (DT): 200.2 MS
ECHO MV E VELOCITY: 0.54 M/S
ECHO MV E/A RATIO: 0.61
ECHO MV E/E' LATERAL: 6
ECHO MV E/E' RATIO (AVERAGED): 8.4
ECHO MV E/E' SEPTAL: 10.8
ECHO MV PRESSURE HALF TIME (PHT): 58.1 MS
ECHO PULMONARY ARTERY END DIASTOLIC PRESSURE: 14 MMHG
ECHO PV MAX VELOCITY: 0.9 M/S
ECHO PV PEAK GRADIENT: 3 MMHG
ECHO RIGHT VENTRICULAR SYSTOLIC PRESSURE (RVSP): 26 MMHG
ECHO RV TAPSE: 1.6 CM (ref 1.7–?)
ECHO TV REGURGITANT MAX VELOCITY: 2.38 M/S
ECHO TV REGURGITANT PEAK GRADIENT: 23 MMHG

## 2022-10-24 PROCEDURE — 93306 TTE W/DOPPLER COMPLETE: CPT | Performed by: SPECIALIST

## 2022-10-24 PROCEDURE — 96374 THER/PROPH/DIAG INJ IV PUSH: CPT

## 2022-12-19 DIAGNOSIS — I10 ESSENTIAL HYPERTENSION: ICD-10-CM

## 2022-12-30 RX ORDER — AMLODIPINE BESYLATE 5 MG/1
TABLET ORAL
Qty: 90 TABLET | Refills: 0 | Status: SHIPPED | OUTPATIENT
Start: 2022-12-30

## 2023-01-16 ENCOUNTER — VIRTUAL VISIT (OUTPATIENT)
Dept: FAMILY MEDICINE CLINIC | Age: 63
End: 2023-01-16
Payer: COMMERCIAL

## 2023-01-16 DIAGNOSIS — Z00.00 WELL ADULT EXAM: ICD-10-CM

## 2023-01-16 DIAGNOSIS — I10 ESSENTIAL HYPERTENSION: Primary | ICD-10-CM

## 2023-01-16 DIAGNOSIS — Z79.899 ENCOUNTER FOR LONG-TERM (CURRENT) USE OF MEDICATIONS: ICD-10-CM

## 2023-01-16 DIAGNOSIS — R73.03 PREDIABETES: ICD-10-CM

## 2023-01-16 DIAGNOSIS — E78.00 HYPERCHOLESTEROLEMIA: ICD-10-CM

## 2023-01-16 PROCEDURE — 99213 OFFICE O/P EST LOW 20 MIN: CPT | Performed by: FAMILY MEDICINE

## 2023-01-16 NOTE — PROGRESS NOTES
Selena Joyner is a 65 y.o. female who was seen by synchronous (real-time) audio-video technology on 1/16/2023 for Cholesterol Problem (Follow-up)      Assessment/ Plan: Doing well today, encouraged ct work on diet and exercise. BPs have been controlled. Checking labs in about 6 months. Pt with some concerns with bruising so stopping ASA x 2 weeks to clarify and will plan to restart after this. Diagnoses and all orders for this visit:    1. Essential hypertension  -     METABOLIC PANEL, COMPREHENSIVE; Future    2. Hypercholesterolemia  -     METABOLIC PANEL, COMPREHENSIVE; Future  -     HEMOGLOBIN A1C WITH EAG; Future  -     LIPID PANEL; Future    3. Prediabetes  -     HEMOGLOBIN A1C WITH EAG; Future    4. Encounter for long-term (current) use of medications  -     CBC WITH AUTOMATED DIFF; Future  -     METABOLIC PANEL, COMPREHENSIVE; Future  -     HEMOGLOBIN A1C WITH EAG; Future  -     LIPID PANEL; Future    5. Well adult exam  -     CBC WITH AUTOMATED DIFF; Future  -     METABOLIC PANEL, COMPREHENSIVE; Future  -     HEMOGLOBIN A1C WITH EAG; Future  -     LIPID PANEL; Future      I spent at least 20 minutes on this visit with this established patient. Follow-up and Dispositions    Return in about 23 weeks (around 6/26/2023), or if symptoms worsen or fail to improve. Subjective:   65 yr old AAF with PMH sig for HTN, HLD, preDM, and Vit D def here for to follow on chronic conditions    Reports stuttering is resolved but still noting when upset/tired (she did have a stutter prior to this episode that was mild and things are back to this baseline). She reports that her HAs are much better. Forgetfulness is improved to baseline. No hx of seizures. Dad's sister had CVA in 76s. Reviewed hx again after last appt: She was seen in ER for concerns with aphasia. Drove to [place] that day and does not remember much after this.   She came to the ER by squad and had stroke eval including CT head, CTA head and neck, MRI brain. All of the evaluation came back with no concerns for stroke. She was set up with neurology and sent home. Prior to her symptoms, she started on on Elavil 25 mg for HAs. She only took 1 dose of Elavil 25 mg the night before her symptoms started and has not taken another dose since. Based on hx, I suspected this was a medication reaction to Elavil given timeframe vs. TIA. She reports improvement in her sx today and has seen neuro - Dr. Osei Duke in 6/2022. Notes reviewed and further work up with Echo and EEG and ended up just doing Echo which came back without concerns. She was also rec to stop ibuprofen and put on steroids to stop HA cycle. Hyperlipidemia & HTN  Blood pressure medication was adjusted in 4/2022 to start on losartan/HCTZ 50/12.5 mg. At our last appointment in office, her BP was much improved at 130s/80s. No new myalgias, no joint pains, no weakness  No chest pain on exertion, no dyspnea on exertion, no swelling of ankles.   Exercising - Minimal, walks at work  Dieting - No  Smoking - No     Lab Results   Component Value Date/Time    Cholesterol, total 151 12/16/2022 07:33 AM    HDL Cholesterol 67 12/16/2022 07:33 AM    LDL, calculated 75 12/16/2022 07:33 AM    LDL, calculated 110 (H) 10/01/2019 11:07 AM    Triglyceride 39 12/16/2022 07:33 AM     ROS  Gen - no fever/chills  Resp - no dyspnea or cough  CV - no chest pain or MONSIVAIS  Rest per HPI    Past Medical History:   Diagnosis Date    DJD (degenerative joint disease) of lumbar spine 1/15/2015    Hypercholesterolemia     Hypertension     Osteoporosis     Prediabetes 9/15/2016    Viral meningitis      Past Surgical History:   Procedure Laterality Date    ENDOSCOPY, COLON, DIAGNOSTIC  7/11    normal    HX ORTHOPAEDIC      tendon repair, hand    HX TUBAL LIGATION          Objective:     Patient-Reported Vitals 6/6/2022   Patient-Reported Systolic  591   Patient-Reported Diastolic 98        Physical exam:  General appearance - alert, well appearing, and in no distress  Eyes -sclera anicteric, no discharge  HEENT- normocephalic, atraumatic, moist mucous membranes, no visualized neck mass  Chest -normal respiratory effort, no visualized signs of respiratory distress  Neurological - alert, awake, normal speech, no focal findings or movement disorder noted  Psych - normal mood and affect  Skin- no apparent lesions    We discussed the expected course, resolution and complications of the diagnosis(es) in detail. Medication risks, benefits, costs, interactions, and alternatives were discussed as indicated. I advised her to contact the office if her condition worsens, changes or fails to improve as anticipated. She expressed understanding with the diagnosis(es) and plan. Diego Ohara, was evaluated through a synchronous (real-time) audio-video encounter. The patient (or guardian if applicable) is aware that this is a billable service, which includes applicable co-pays. This Virtual Visit was conducted with patient's (and/or legal guardian's) consent. The visit was conducted pursuant to the emergency declaration under the [address] [place] [address] authority and the [employer] General Act. Patient identification was verified, and a caregiver was present when appropriate. The patient was located at: Home: [place] [phone]  The provider was located at:  Facility (Appt Department): [address] [place] [phone]        Shivam Her MD

## 2023-01-20 ENCOUNTER — TRANSCRIBE ORDER (OUTPATIENT)
Dept: SCHEDULING | Age: 63
End: 2023-01-20

## 2023-01-20 DIAGNOSIS — Z12.31 VISIT FOR SCREENING MAMMOGRAM: Primary | ICD-10-CM

## 2023-02-08 ENCOUNTER — TRANSCRIBE ORDER (OUTPATIENT)
Dept: SCHEDULING | Age: 63
End: 2023-02-08

## 2023-02-08 DIAGNOSIS — Z12.31 VISIT FOR SCREENING MAMMOGRAM: Primary | ICD-10-CM

## 2023-03-26 DIAGNOSIS — E78.00 HYPERCHOLESTEROLEMIA: ICD-10-CM

## 2023-03-26 DIAGNOSIS — I10 ESSENTIAL HYPERTENSION: ICD-10-CM

## 2023-03-26 RX ORDER — AMLODIPINE BESYLATE 5 MG/1
TABLET ORAL
Qty: 90 TABLET | Refills: 0 | Status: SHIPPED | OUTPATIENT
Start: 2023-03-26

## 2023-03-26 RX ORDER — ROSUVASTATIN CALCIUM 5 MG/1
TABLET, COATED ORAL
Qty: 90 TABLET | Refills: 1 | Status: SHIPPED | OUTPATIENT
Start: 2023-03-26

## 2023-03-26 RX ORDER — LOSARTAN POTASSIUM AND HYDROCHLOROTHIAZIDE 12.5; 5 MG/1; MG/1
TABLET ORAL
Qty: 90 TABLET | Refills: 1 | Status: SHIPPED | OUTPATIENT
Start: 2023-03-26

## 2023-04-22 DIAGNOSIS — Z00.00 WELL ADULT EXAM: ICD-10-CM

## 2023-04-22 DIAGNOSIS — Z79.899 ENCOUNTER FOR LONG-TERM (CURRENT) USE OF MEDICATIONS: Primary | ICD-10-CM

## 2023-04-22 DIAGNOSIS — Z12.31 VISIT FOR SCREENING MAMMOGRAM: Primary | ICD-10-CM

## 2023-04-23 DIAGNOSIS — E78.00 HYPERCHOLESTEROLEMIA: Primary | ICD-10-CM

## 2023-04-23 DIAGNOSIS — Z00.00 WELL ADULT EXAM: ICD-10-CM

## 2023-04-23 DIAGNOSIS — Z79.899 ENCOUNTER FOR LONG-TERM (CURRENT) USE OF MEDICATIONS: ICD-10-CM

## 2023-04-28 DIAGNOSIS — I10 ESSENTIAL (PRIMARY) HYPERTENSION: Primary | ICD-10-CM

## 2023-04-28 DIAGNOSIS — R73.03 PREDIABETES: ICD-10-CM

## 2023-04-28 DIAGNOSIS — I10 ESSENTIAL (PRIMARY) HYPERTENSION: ICD-10-CM

## 2023-06-22 RX ORDER — AMLODIPINE BESYLATE 5 MG/1
TABLET ORAL
Qty: 90 TABLET | Refills: 1 | Status: SHIPPED | OUTPATIENT
Start: 2023-06-22

## 2023-06-22 NOTE — TELEPHONE ENCOUNTER
Last appointment: 1/16/23  Next appointment: Kai Javi to follow-up 6/26/23  Previous refill encounter(s): 3/26/23 #90    Requested Prescriptions     Pending Prescriptions Disp Refills    amLODIPine (NORVASC) 5 MG tablet [Pharmacy Med Name: AMLODIPINE TAB 5MG] 90 tablet 1     Sig: TAKE 1 TABLET DAILY         For Pharmacy Admin Tracking Only    Program: Medication Refill  CPA in place:    Recommendation Provided To:    Intervention Detail: New Rx: 1, reason: Patient Preference  Intervention Accepted By:   Luisa Thomas Closed?:    Time Spent (min): 5

## 2023-08-22 LAB
ALBUMIN SERPL-MCNC: 4.6 G/DL (ref 3.9–4.9)
ALBUMIN/GLOB SERPL: 1.6 {RATIO} (ref 1.2–2.2)
ALP SERPL-CCNC: 60 IU/L (ref 44–121)
ALT SERPL-CCNC: 8 IU/L (ref 0–32)
AST SERPL-CCNC: 16 IU/L (ref 0–40)
BASOPHILS # BLD AUTO: 0 X10E3/UL (ref 0–0.2)
BASOPHILS NFR BLD AUTO: 1 %
BILIRUB SERPL-MCNC: 0.4 MG/DL (ref 0–1.2)
BUN SERPL-MCNC: 18 MG/DL (ref 8–27)
BUN/CREAT SERPL: 23 (ref 12–28)
CALCIUM SERPL-MCNC: 9.7 MG/DL (ref 8.7–10.3)
CHLORIDE SERPL-SCNC: 106 MMOL/L (ref 96–106)
CHOLEST SERPL-MCNC: 148 MG/DL (ref 100–199)
CO2 SERPL-SCNC: 25 MMOL/L (ref 20–29)
CREAT SERPL-MCNC: 0.77 MG/DL (ref 0.57–1)
EGFRCR SERPLBLD CKD-EPI 2021: 87 ML/MIN/1.73
EOSINOPHIL # BLD AUTO: 0.1 X10E3/UL (ref 0–0.4)
EOSINOPHIL NFR BLD AUTO: 2 %
ERYTHROCYTE [DISTWIDTH] IN BLOOD BY AUTOMATED COUNT: 13.5 % (ref 11.7–15.4)
EST. AVERAGE GLUCOSE BLD GHB EST-MCNC: 128 MG/DL
GLOBULIN SER CALC-MCNC: 2.8 G/DL (ref 1.5–4.5)
GLUCOSE SERPL-MCNC: 98 MG/DL (ref 70–99)
HBA1C MFR BLD: 6.1 % (ref 4.8–5.6)
HCT VFR BLD AUTO: 43.8 % (ref 34–46.6)
HDLC SERPL-MCNC: 66 MG/DL
HGB BLD-MCNC: 14.2 G/DL (ref 11.1–15.9)
IMM GRANULOCYTES # BLD AUTO: 0 X10E3/UL (ref 0–0.1)
IMM GRANULOCYTES NFR BLD AUTO: 0 %
IMP & REVIEW OF LAB RESULTS: NORMAL
LDLC SERPL CALC-MCNC: 74 MG/DL (ref 0–99)
LYMPHOCYTES # BLD AUTO: 1.8 X10E3/UL (ref 0.7–3.1)
LYMPHOCYTES NFR BLD AUTO: 43 %
MCH RBC QN AUTO: 26.1 PG (ref 26.6–33)
MCHC RBC AUTO-ENTMCNC: 32.4 G/DL (ref 31.5–35.7)
MCV RBC AUTO: 81 FL (ref 79–97)
MONOCYTES # BLD AUTO: 0.3 X10E3/UL (ref 0.1–0.9)
MONOCYTES NFR BLD AUTO: 6 %
NEUTROPHILS # BLD AUTO: 2 X10E3/UL (ref 1.4–7)
NEUTROPHILS NFR BLD AUTO: 48 %
PLATELET # BLD AUTO: 278 X10E3/UL (ref 150–450)
POTASSIUM SERPL-SCNC: 4.5 MMOL/L (ref 3.5–5.2)
PROT SERPL-MCNC: 7.4 G/DL (ref 6–8.5)
RBC # BLD AUTO: 5.44 X10E6/UL (ref 3.77–5.28)
SODIUM SERPL-SCNC: 145 MMOL/L (ref 134–144)
TRIGL SERPL-MCNC: 31 MG/DL (ref 0–149)
VLDLC SERPL CALC-MCNC: 8 MG/DL (ref 5–40)
WBC # BLD AUTO: 4.1 X10E3/UL (ref 3.4–10.8)

## 2023-09-26 NOTE — TELEPHONE ENCOUNTER
Last appointment: 1/16/23  Next appointment: 10/3/23  Previous refill encounter(s): 3/26/23 Hyzaar #90 with 1 refill, 9/30/22 ASA #90 with 3 refills    Requested Prescriptions     Pending Prescriptions Disp Refills    losartan-hydroCHLOROthiazide (HYZAAR) 50-12.5 MG per tablet [Pharmacy Med Name: LOSARTAN/HCT TAB 50-12.5] 90 tablet 0     Sig: Take 1 tablet by mouth daily    ASPIRIN LOW DOSE 81 MG EC tablet [Pharmacy Med Name: ASPIRIN LOW  TAB 81MG EC] 90 tablet 0     Sig: TAKE 1 TABLET DAILY         For Pharmacy Admin Tracking Only    Program: Medication Refill  CPA in place:    Recommendation Provided To:    Intervention Detail: New Rx: 2, reason: Patient Preference  Intervention Accepted By:   Quentin Sena Closed?:    Time Spent (min): 5

## 2023-09-27 RX ORDER — LOSARTAN POTASSIUM AND HYDROCHLOROTHIAZIDE 12.5; 5 MG/1; MG/1
1 TABLET ORAL DAILY
Qty: 90 TABLET | Refills: 0 | Status: SHIPPED | OUTPATIENT
Start: 2023-09-27

## 2023-09-27 RX ORDER — ASPIRIN 81 MG/1
81 TABLET, COATED ORAL DAILY
Qty: 90 TABLET | Refills: 0 | Status: SHIPPED | OUTPATIENT
Start: 2023-09-27

## 2023-10-02 SDOH — HEALTH STABILITY: PHYSICAL HEALTH: ON AVERAGE, HOW MANY DAYS PER WEEK DO YOU ENGAGE IN MODERATE TO STRENUOUS EXERCISE (LIKE A BRISK WALK)?: 4 DAYS

## 2023-10-02 SDOH — HEALTH STABILITY: PHYSICAL HEALTH: ON AVERAGE, HOW MANY MINUTES DO YOU ENGAGE IN EXERCISE AT THIS LEVEL?: 30 MIN

## 2023-10-02 ASSESSMENT — SOCIAL DETERMINANTS OF HEALTH (SDOH)
WITHIN THE LAST YEAR, HAVE YOU BEEN HUMILIATED OR EMOTIONALLY ABUSED IN OTHER WAYS BY YOUR PARTNER OR EX-PARTNER?: NO
WITHIN THE LAST YEAR, HAVE YOU BEEN AFRAID OF YOUR PARTNER OR EX-PARTNER?: NO
WITHIN THE LAST YEAR, HAVE TO BEEN RAPED OR FORCED TO HAVE ANY KIND OF SEXUAL ACTIVITY BY YOUR PARTNER OR EX-PARTNER?: NO
WITHIN THE LAST YEAR, HAVE YOU BEEN KICKED, HIT, SLAPPED, OR OTHERWISE PHYSICALLY HURT BY YOUR PARTNER OR EX-PARTNER?: NO

## 2023-10-03 ENCOUNTER — OFFICE VISIT (OUTPATIENT)
Age: 63
End: 2023-10-03
Payer: COMMERCIAL

## 2023-10-03 VITALS
BODY MASS INDEX: 28.35 KG/M2 | HEART RATE: 76 BPM | RESPIRATION RATE: 20 BRPM | WEIGHT: 160 LBS | TEMPERATURE: 97.7 F | DIASTOLIC BLOOD PRESSURE: 79 MMHG | HEIGHT: 63 IN | OXYGEN SATURATION: 97 % | SYSTOLIC BLOOD PRESSURE: 130 MMHG

## 2023-10-03 DIAGNOSIS — R73.03 BORDERLINE DIABETES MELLITUS: ICD-10-CM

## 2023-10-03 DIAGNOSIS — E78.00 PURE HYPERCHOLESTEROLEMIA, UNSPECIFIED: ICD-10-CM

## 2023-10-03 DIAGNOSIS — I10 ESSENTIAL (PRIMARY) HYPERTENSION: Primary | ICD-10-CM

## 2023-10-03 PROCEDURE — 99214 OFFICE O/P EST MOD 30 MIN: CPT | Performed by: INTERNAL MEDICINE

## 2023-10-03 PROCEDURE — 3078F DIAST BP <80 MM HG: CPT | Performed by: INTERNAL MEDICINE

## 2023-10-03 PROCEDURE — 3074F SYST BP LT 130 MM HG: CPT | Performed by: INTERNAL MEDICINE

## 2023-10-03 RX ORDER — ROSUVASTATIN CALCIUM 5 MG/1
5 TABLET, COATED ORAL DAILY
Qty: 90 TABLET | Refills: 1 | Status: SHIPPED | OUTPATIENT
Start: 2023-10-03

## 2023-10-03 SDOH — ECONOMIC STABILITY: FOOD INSECURITY: WITHIN THE PAST 12 MONTHS, YOU WORRIED THAT YOUR FOOD WOULD RUN OUT BEFORE YOU GOT MONEY TO BUY MORE.: NEVER TRUE

## 2023-10-03 SDOH — ECONOMIC STABILITY: HOUSING INSECURITY
IN THE LAST 12 MONTHS, WAS THERE A TIME WHEN YOU DID NOT HAVE A STEADY PLACE TO SLEEP OR SLEPT IN A SHELTER (INCLUDING NOW)?: NO

## 2023-10-03 SDOH — ECONOMIC STABILITY: INCOME INSECURITY: HOW HARD IS IT FOR YOU TO PAY FOR THE VERY BASICS LIKE FOOD, HOUSING, MEDICAL CARE, AND HEATING?: NOT HARD AT ALL

## 2023-10-03 SDOH — ECONOMIC STABILITY: FOOD INSECURITY: WITHIN THE PAST 12 MONTHS, THE FOOD YOU BOUGHT JUST DIDN'T LAST AND YOU DIDN'T HAVE MONEY TO GET MORE.: NEVER TRUE

## 2023-10-03 ASSESSMENT — ANXIETY QUESTIONNAIRES
IF YOU CHECKED OFF ANY PROBLEMS ON THIS QUESTIONNAIRE, HOW DIFFICULT HAVE THESE PROBLEMS MADE IT FOR YOU TO DO YOUR WORK, TAKE CARE OF THINGS AT HOME, OR GET ALONG WITH OTHER PEOPLE: NOT DIFFICULT AT ALL
6. BECOMING EASILY ANNOYED OR IRRITABLE: 0
5. BEING SO RESTLESS THAT IT IS HARD TO SIT STILL: 0
1. FEELING NERVOUS, ANXIOUS, OR ON EDGE: 0
4. TROUBLE RELAXING: 0
GAD7 TOTAL SCORE: 0
7. FEELING AFRAID AS IF SOMETHING AWFUL MIGHT HAPPEN: 0
3. WORRYING TOO MUCH ABOUT DIFFERENT THINGS: 0
2. NOT BEING ABLE TO STOP OR CONTROL WORRYING: 0

## 2023-10-03 ASSESSMENT — PATIENT HEALTH QUESTIONNAIRE - PHQ9
SUM OF ALL RESPONSES TO PHQ QUESTIONS 1-9: 0
SUM OF ALL RESPONSES TO PHQ QUESTIONS 1-9: 0
2. FEELING DOWN, DEPRESSED OR HOPELESS: 0
SUM OF ALL RESPONSES TO PHQ QUESTIONS 1-9: 0
SUM OF ALL RESPONSES TO PHQ QUESTIONS 1-9: 0
1. LITTLE INTEREST OR PLEASURE IN DOING THINGS: 0
SUM OF ALL RESPONSES TO PHQ9 QUESTIONS 1 & 2: 0

## 2023-12-29 RX ORDER — LOSARTAN POTASSIUM AND HYDROCHLOROTHIAZIDE 12.5; 5 MG/1; MG/1
1 TABLET ORAL DAILY
Qty: 90 TABLET | Refills: 1 | Status: SHIPPED | OUTPATIENT
Start: 2023-12-29

## 2023-12-29 RX ORDER — AMLODIPINE BESYLATE 5 MG/1
5 TABLET ORAL DAILY
Qty: 90 TABLET | Refills: 1 | Status: SHIPPED | OUTPATIENT
Start: 2023-12-29

## 2023-12-29 RX ORDER — ASPIRIN 81 MG/1
81 TABLET ORAL DAILY
Qty: 90 TABLET | Refills: 1 | Status: SHIPPED | OUTPATIENT
Start: 2023-12-29

## 2023-12-29 NOTE — TELEPHONE ENCOUNTER
Last appointment: 10/3/23  Next appointment: Moriah Pickett to follow-up 4/3/24  Previous refill encounter(s): 9/27/23 ASA & Hyzaar 90 d/s, 6/22/23 Norvasc #90 with 1 refill    Requested Prescriptions     Pending Prescriptions Disp Refills    amLODIPine (NORVASC) 5 MG tablet 90 tablet 1     Sig: Take 1 tablet by mouth daily    aspirin (ASPIRIN LOW DOSE) 81 MG EC tablet 90 tablet 1     Sig: Take 1 tablet by mouth daily    losartan-hydroCHLOROthiazide (HYZAAR) 50-12.5 MG per tablet 90 tablet 1     Sig: Take 1 tablet by mouth daily         For Pharmacy Admin Tracking Only    Program: Medication Refill  CPA in place:    Recommendation Provided To:    Intervention Detail: New Rx: 3, reason: Patient Preference  Intervention Accepted By:   Luda Hare Closed?:    Time Spent (min): 5

## 2024-04-26 ENCOUNTER — OFFICE VISIT (OUTPATIENT)
Age: 64
End: 2024-04-26
Payer: COMMERCIAL

## 2024-04-26 VITALS
RESPIRATION RATE: 20 BRPM | HEART RATE: 93 BPM | WEIGHT: 160 LBS | SYSTOLIC BLOOD PRESSURE: 117 MMHG | OXYGEN SATURATION: 97 % | DIASTOLIC BLOOD PRESSURE: 78 MMHG | HEIGHT: 63 IN | TEMPERATURE: 97.7 F | BODY MASS INDEX: 28.35 KG/M2

## 2024-04-26 DIAGNOSIS — Z13.29 SCREENING FOR THYROID DISORDER: ICD-10-CM

## 2024-04-26 DIAGNOSIS — R73.03 PREDIABETES: ICD-10-CM

## 2024-04-26 DIAGNOSIS — E78.00 PURE HYPERCHOLESTEROLEMIA, UNSPECIFIED: ICD-10-CM

## 2024-04-26 DIAGNOSIS — Z11.4 ENCOUNTER FOR SCREENING FOR HUMAN IMMUNODEFICIENCY VIRUS (HIV): ICD-10-CM

## 2024-04-26 DIAGNOSIS — Z11.59 NEED FOR HEPATITIS C SCREENING TEST: ICD-10-CM

## 2024-04-26 DIAGNOSIS — I10 ESSENTIAL (PRIMARY) HYPERTENSION: ICD-10-CM

## 2024-04-26 DIAGNOSIS — Z00.00 ENCOUNTER FOR ANNUAL PHYSICAL EXAM: Primary | ICD-10-CM

## 2024-04-26 DIAGNOSIS — E55.9 VITAMIN D DEFICIENCY: ICD-10-CM

## 2024-04-26 PROBLEM — N39.3 STRESS INCONTINENCE: Status: ACTIVE | Noted: 2020-08-04

## 2024-04-26 PROBLEM — N95.1 MENOPAUSAL SYMPTOM: Status: ACTIVE | Noted: 2020-08-04

## 2024-04-26 PROCEDURE — 3074F SYST BP LT 130 MM HG: CPT | Performed by: INTERNAL MEDICINE

## 2024-04-26 PROCEDURE — 3078F DIAST BP <80 MM HG: CPT | Performed by: INTERNAL MEDICINE

## 2024-04-26 PROCEDURE — 99396 PREV VISIT EST AGE 40-64: CPT | Performed by: INTERNAL MEDICINE

## 2024-04-26 RX ORDER — ROSUVASTATIN CALCIUM 5 MG/1
5 TABLET, COATED ORAL DAILY
Qty: 90 TABLET | Refills: 1 | Status: SHIPPED | OUTPATIENT
Start: 2024-04-26

## 2024-04-26 SDOH — ECONOMIC STABILITY: FOOD INSECURITY: WITHIN THE PAST 12 MONTHS, THE FOOD YOU BOUGHT JUST DIDN'T LAST AND YOU DIDN'T HAVE MONEY TO GET MORE.: NEVER TRUE

## 2024-04-26 SDOH — ECONOMIC STABILITY: FOOD INSECURITY: WITHIN THE PAST 12 MONTHS, YOU WORRIED THAT YOUR FOOD WOULD RUN OUT BEFORE YOU GOT MONEY TO BUY MORE.: NEVER TRUE

## 2024-04-26 SDOH — ECONOMIC STABILITY: INCOME INSECURITY: HOW HARD IS IT FOR YOU TO PAY FOR THE VERY BASICS LIKE FOOD, HOUSING, MEDICAL CARE, AND HEATING?: NOT HARD AT ALL

## 2024-04-26 ASSESSMENT — ANXIETY QUESTIONNAIRES
3. WORRYING TOO MUCH ABOUT DIFFERENT THINGS: NOT AT ALL
7. FEELING AFRAID AS IF SOMETHING AWFUL MIGHT HAPPEN: NOT AT ALL
1. FEELING NERVOUS, ANXIOUS, OR ON EDGE: NOT AT ALL
IF YOU CHECKED OFF ANY PROBLEMS ON THIS QUESTIONNAIRE, HOW DIFFICULT HAVE THESE PROBLEMS MADE IT FOR YOU TO DO YOUR WORK, TAKE CARE OF THINGS AT HOME, OR GET ALONG WITH OTHER PEOPLE: NOT DIFFICULT AT ALL
4. TROUBLE RELAXING: NOT AT ALL
5. BEING SO RESTLESS THAT IT IS HARD TO SIT STILL: NOT AT ALL
GAD7 TOTAL SCORE: 0
6. BECOMING EASILY ANNOYED OR IRRITABLE: NOT AT ALL
2. NOT BEING ABLE TO STOP OR CONTROL WORRYING: NOT AT ALL

## 2024-04-26 ASSESSMENT — PATIENT HEALTH QUESTIONNAIRE - PHQ9
SUM OF ALL RESPONSES TO PHQ QUESTIONS 1-9: 0
SUM OF ALL RESPONSES TO PHQ QUESTIONS 1-9: 0
2. FEELING DOWN, DEPRESSED OR HOPELESS: NOT AT ALL
SUM OF ALL RESPONSES TO PHQ QUESTIONS 1-9: 0
SUM OF ALL RESPONSES TO PHQ QUESTIONS 1-9: 0
1. LITTLE INTEREST OR PLEASURE IN DOING THINGS: NOT AT ALL
SUM OF ALL RESPONSES TO PHQ9 QUESTIONS 1 & 2: 0

## 2024-04-26 NOTE — PROGRESS NOTES
\"Have you been to the ER, urgent care clinic since your last visit?  Hospitalized since your last visit?\"    NO    “Have you seen or consulted any other health care providers outside of [facility] since your last visit?”    NO    Have you had a mammogram?”   YES    Date of last Mammogram: 2/9/2022             Click Here for Release of Records Request   
Hemoglobin A1C; Future    Vitamin D deficiency  -     Vitamin D 25 Hydroxy; Future    Essential (primary) hypertension  -     Comprehensive Metabolic Panel; Future  -     CBC with Auto Differential; Future    Screening for thyroid disorder  -     TSH; Future    Need for hepatitis C screening test  -     Hepatitis C Antibody; Future    Encounter for screening for human immunodeficiency virus (HIV)  -     HIV 1/2 Ag/Ab, 4TH Generation,W Rflx Confirm; Future      Return 2-3 weeks, for results review.

## 2024-04-30 LAB
25(OH)D3+25(OH)D2 SERPL-MCNC: 21.1 NG/ML (ref 30–100)
ALBUMIN SERPL-MCNC: 4.7 G/DL (ref 3.9–4.9)
ALBUMIN/GLOB SERPL: 1.7 {RATIO} (ref 1.2–2.2)
ALP SERPL-CCNC: 78 IU/L (ref 44–121)
ALT SERPL-CCNC: 13 IU/L (ref 0–32)
AST SERPL-CCNC: 15 IU/L (ref 0–40)
BASOPHILS NFR BLD AUTO: 1 %
BILIRUB SERPL-MCNC: 0.3 MG/DL (ref 0–1.2)
BUN SERPL-MCNC: 16 MG/DL (ref 8–27)
BUN/CREAT SERPL: 23 (ref 12–28)
CALCIUM SERPL-MCNC: 9.6 MG/DL (ref 8.7–10.3)
CHLORIDE SERPL-SCNC: 105 MMOL/L (ref 96–106)
CHOLEST SERPL-MCNC: 201 MG/DL (ref 100–199)
CO2 SERPL-SCNC: 26 MMOL/L (ref 20–29)
CREAT SERPL-MCNC: 0.71 MG/DL (ref 0.57–1)
EGFRCR SERPLBLD CKD-EPI 2021: 96 ML/MIN/1.73
EOSINOPHIL # BLD AUTO: 0.1 X10E3/UL (ref 0–0.4)
EOSINOPHIL NFR BLD AUTO: 2 %
ERYTHROCYTE [DISTWIDTH] IN BLOOD BY AUTOMATED COUNT: 13.8 % (ref 11.7–15.4)
GLOBULIN SER CALC-MCNC: 2.8 G/DL (ref 1.5–4.5)
GLUCOSE SERPL-MCNC: 101 MG/DL (ref 70–99)
HBA1C MFR BLD: 6.3 % (ref 4.8–5.6)
HCT VFR BLD AUTO: 45.2 % (ref 34–46.6)
HCV IGG SERPL QL IA: NON REACTIVE
HDLC SERPL-MCNC: 68 MG/DL
HGB BLD-MCNC: 13.9 G/DL (ref 11.1–15.9)
HIV 1+2 AB+HIV1 P24 AG SERPL QL IA: NON REACTIVE
IMM GRANULOCYTES # BLD AUTO: 0 X10E3/UL (ref 0–0.1)
IMM GRANULOCYTES NFR BLD AUTO: 0 %
LDLC SERPL CALC-MCNC: 126 MG/DL (ref 0–99)
LYMPHOCYTES # BLD AUTO: 1.8 X10E3/UL (ref 0.7–3.1)
LYMPHOCYTES NFR BLD AUTO: 48 %
MCH RBC QN AUTO: 25.6 PG (ref 26.6–33)
MCHC RBC AUTO-ENTMCNC: 30.8 G/DL (ref 31.5–35.7)
MCV RBC AUTO: 83 FL (ref 79–97)
MONOCYTES # BLD AUTO: 0.3 X10E3/UL (ref 0.1–0.9)
MONOCYTES NFR BLD AUTO: 7 %
NEUTROPHILS # BLD AUTO: 1.6 X10E3/UL (ref 1.4–7)
NEUTROPHILS NFR BLD AUTO: 42 %
PLATELET # BLD AUTO: 330 X10E3/UL (ref 150–450)
POTASSIUM SERPL-SCNC: 4.2 MMOL/L (ref 3.5–5.2)
PROT SERPL-MCNC: 7.5 G/DL (ref 6–8.5)
RBC # BLD AUTO: 5.43 X10E6/UL (ref 3.77–5.28)
SODIUM SERPL-SCNC: 146 MMOL/L (ref 134–144)
TRIGL SERPL-MCNC: 38 MG/DL (ref 0–149)
TSH SERPL DL<=0.005 MIU/L-ACNC: 0.57 UIU/ML (ref 0.45–4.5)
VLDLC SERPL CALC-MCNC: 7 MG/DL (ref 5–40)
WBC # BLD AUTO: 3.7 X10E3/UL (ref 3.4–10.8)

## 2024-05-03 DIAGNOSIS — E78.00 PURE HYPERCHOLESTEROLEMIA, UNSPECIFIED: ICD-10-CM

## 2024-05-03 DIAGNOSIS — E55.9 VITAMIN D DEFICIENCY: ICD-10-CM

## 2024-05-03 DIAGNOSIS — R73.03 PREDIABETES: ICD-10-CM

## 2024-05-03 DIAGNOSIS — Z13.29 SCREENING FOR THYROID DISORDER: ICD-10-CM

## 2024-05-03 DIAGNOSIS — Z11.4 ENCOUNTER FOR SCREENING FOR HUMAN IMMUNODEFICIENCY VIRUS (HIV): ICD-10-CM

## 2024-05-03 DIAGNOSIS — Z11.59 NEED FOR HEPATITIS C SCREENING TEST: ICD-10-CM

## 2024-05-03 DIAGNOSIS — Z00.00 ENCOUNTER FOR ANNUAL PHYSICAL EXAM: ICD-10-CM

## 2024-05-03 DIAGNOSIS — I10 ESSENTIAL (PRIMARY) HYPERTENSION: ICD-10-CM

## 2024-05-15 ENCOUNTER — TELEMEDICINE (OUTPATIENT)
Age: 64
End: 2024-05-15
Payer: COMMERCIAL

## 2024-05-15 DIAGNOSIS — R73.03 BORDERLINE DIABETES MELLITUS: ICD-10-CM

## 2024-05-15 DIAGNOSIS — E78.00 PURE HYPERCHOLESTEROLEMIA, UNSPECIFIED: Primary | ICD-10-CM

## 2024-05-15 DIAGNOSIS — E55.9 VITAMIN D DEFICIENCY: ICD-10-CM

## 2024-05-15 PROCEDURE — 99214 OFFICE O/P EST MOD 30 MIN: CPT | Performed by: INTERNAL MEDICINE

## 2024-05-15 RX ORDER — ROSUVASTATIN CALCIUM 5 MG/1
5 TABLET, COATED ORAL DAILY
Qty: 90 TABLET | Refills: 1 | Status: SHIPPED | OUTPATIENT
Start: 2024-05-15

## 2024-05-15 RX ORDER — ACETAMINOPHEN 160 MG
1 TABLET,DISINTEGRATING ORAL DAILY
Qty: 90 CAPSULE | Refills: 2 | Status: SHIPPED | OUTPATIENT
Start: 2024-05-15

## 2024-05-15 NOTE — PROGRESS NOTES
Jovan Gilliam, was evaluated through a synchronous (real-time) audio-video encounter. The patient (or guardian if applicable) is aware that this is a billable service, which includes applicable co-pays. This Virtual Visit was conducted with patient's (and/or legal guardian's) consent. Patient identification was verified, and a caregiver was present when appropriate.   The patient was located at Home: [address] [place] [phone]  Provider was located at Facility (Appt Dept): [address] [facility] [address]  [place] [address]  Confirm you are appropriately licensed, registered, or certified to deliver care in the state where the patient is located as indicated above. If you are not or unsure, please re-schedule the visit: Yes, I confirm.     Jovan Gilliam (:  1960) is a Established patient, presenting virtually for evaluation of the following:    Assessment & Plan   Below is the assessment and plan developed based on review of pertinent history, physical exam, labs, studies, and medications.  1. Pure hypercholesterolemia, unspecified  -     rosuvastatin (CRESTOR) 5 MG tablet; Take 1 tablet by mouth daily, Disp-90 tablet, R-1Normal  2. Vitamin D deficiency  -     Cholecalciferol (VITAMIN D3) 50 MCG ( UT) CAPS; Take 1 capsule by mouth daily, Disp-90 capsule, R-2Normal  3. Borderline diabetes mellitus    Return in about 4 months (around 9/15/2024) for routine follow up.       Subjective   Jovan Gilliam is a 64 y.o. female is seen for lab review.  Cholesterol is trending up compared last; advised to continue Crestor.  Serum vit D is low; supplement has been ordered.   A1C is at borderline diabetes, low carb diet is advised.    Review of Systems   As pre hpi    Objective   Patient-Reported Vitals  No data recorded     Physical Exam  [INSTRUCTIONS:  \"[x]\" Indicates a positive item  \"[]\" Indicates a negative item  -- DELETE ALL ITEMS NOT EXAMINED]    Constitutional: [x] Appears  Airway patent, Nasal mucosa clear. Mouth with normal mucosa. Throat has no vesicles, no oropharyngeal exudates and uvula is midline.

## 2024-07-15 RX ORDER — LOSARTAN POTASSIUM AND HYDROCHLOROTHIAZIDE 12.5; 5 MG/1; MG/1
1 TABLET ORAL DAILY
Qty: 90 TABLET | Refills: 1 | Status: SHIPPED | OUTPATIENT
Start: 2024-07-15

## 2024-07-15 RX ORDER — AMLODIPINE BESYLATE 5 MG/1
5 TABLET ORAL DAILY
Qty: 90 TABLET | Refills: 1 | Status: SHIPPED | OUTPATIENT
Start: 2024-07-15

## 2024-09-20 ENCOUNTER — OFFICE VISIT (OUTPATIENT)
Age: 64
End: 2024-09-20
Payer: COMMERCIAL

## 2024-09-20 VITALS
RESPIRATION RATE: 20 BRPM | TEMPERATURE: 97.3 F | HEIGHT: 63 IN | BODY MASS INDEX: 29.06 KG/M2 | HEART RATE: 71 BPM | OXYGEN SATURATION: 96 % | WEIGHT: 164 LBS | SYSTOLIC BLOOD PRESSURE: 142 MMHG | DIASTOLIC BLOOD PRESSURE: 75 MMHG

## 2024-09-20 DIAGNOSIS — N95.1 POSTMENOPAUSAL SYNDROME: ICD-10-CM

## 2024-09-20 DIAGNOSIS — Z01.419 ENCOUNTER FOR GYNECOLOGICAL EXAMINATION: ICD-10-CM

## 2024-09-20 DIAGNOSIS — E55.9 VITAMIN D DEFICIENCY: ICD-10-CM

## 2024-09-20 DIAGNOSIS — R73.03 BORDERLINE DIABETES MELLITUS: ICD-10-CM

## 2024-09-20 DIAGNOSIS — E78.00 PURE HYPERCHOLESTEROLEMIA, UNSPECIFIED: Primary | ICD-10-CM

## 2024-09-20 PROCEDURE — 99214 OFFICE O/P EST MOD 30 MIN: CPT | Performed by: INTERNAL MEDICINE

## 2024-09-20 PROCEDURE — 3078F DIAST BP <80 MM HG: CPT | Performed by: INTERNAL MEDICINE

## 2024-09-20 PROCEDURE — 3077F SYST BP >= 140 MM HG: CPT | Performed by: INTERNAL MEDICINE

## 2024-09-20 RX ORDER — MULTIVIT,CA,MIN/D3/HERBAL 181 1000 UNIT
1 TABLET ORAL NIGHTLY
Qty: 90 TABLET | Refills: 0 | Status: SHIPPED | OUTPATIENT
Start: 2024-09-20

## 2024-09-20 RX ORDER — ACETAMINOPHEN 160 MG
1 TABLET,DISINTEGRATING ORAL DAILY
Qty: 90 CAPSULE | Refills: 2 | Status: SHIPPED | OUTPATIENT
Start: 2024-09-20

## 2024-09-20 SDOH — ECONOMIC STABILITY: FOOD INSECURITY: WITHIN THE PAST 12 MONTHS, THE FOOD YOU BOUGHT JUST DIDN'T LAST AND YOU DIDN'T HAVE MONEY TO GET MORE.: NEVER TRUE

## 2024-09-20 SDOH — ECONOMIC STABILITY: FOOD INSECURITY: WITHIN THE PAST 12 MONTHS, YOU WORRIED THAT YOUR FOOD WOULD RUN OUT BEFORE YOU GOT MONEY TO BUY MORE.: NEVER TRUE

## 2024-09-20 SDOH — ECONOMIC STABILITY: INCOME INSECURITY: HOW HARD IS IT FOR YOU TO PAY FOR THE VERY BASICS LIKE FOOD, HOUSING, MEDICAL CARE, AND HEATING?: NOT HARD AT ALL

## 2024-09-20 ASSESSMENT — PATIENT HEALTH QUESTIONNAIRE - PHQ9
SUM OF ALL RESPONSES TO PHQ QUESTIONS 1-9: 0
SUM OF ALL RESPONSES TO PHQ9 QUESTIONS 1 & 2: 0
SUM OF ALL RESPONSES TO PHQ QUESTIONS 1-9: 0
1. LITTLE INTEREST OR PLEASURE IN DOING THINGS: NOT AT ALL
2. FEELING DOWN, DEPRESSED OR HOPELESS: NOT AT ALL

## 2024-09-20 ASSESSMENT — ANXIETY QUESTIONNAIRES
1. FEELING NERVOUS, ANXIOUS, OR ON EDGE: NOT AT ALL
7. FEELING AFRAID AS IF SOMETHING AWFUL MIGHT HAPPEN: NOT AT ALL
IF YOU CHECKED OFF ANY PROBLEMS ON THIS QUESTIONNAIRE, HOW DIFFICULT HAVE THESE PROBLEMS MADE IT FOR YOU TO DO YOUR WORK, TAKE CARE OF THINGS AT HOME, OR GET ALONG WITH OTHER PEOPLE: NOT DIFFICULT AT ALL
6. BECOMING EASILY ANNOYED OR IRRITABLE: NOT AT ALL
GAD7 TOTAL SCORE: 0
4. TROUBLE RELAXING: NOT AT ALL
5. BEING SO RESTLESS THAT IT IS HARD TO SIT STILL: NOT AT ALL
2. NOT BEING ABLE TO STOP OR CONTROL WORRYING: NOT AT ALL
3. WORRYING TOO MUCH ABOUT DIFFERENT THINGS: NOT AT ALL

## 2024-09-21 DIAGNOSIS — N95.1 POSTMENOPAUSAL SYNDROME: ICD-10-CM

## 2024-09-23 RX ORDER — SOY ISOFLAV/BCOHOSH/CISSUS QUA 198 MG
1 CAPSULE ORAL NIGHTLY
Qty: 90 TABLET | Refills: 0 | Status: SHIPPED | OUTPATIENT
Start: 2024-09-23

## 2024-09-27 DIAGNOSIS — R73.03 BORDERLINE DIABETES MELLITUS: ICD-10-CM

## 2024-09-27 DIAGNOSIS — E78.00 PURE HYPERCHOLESTEROLEMIA, UNSPECIFIED: ICD-10-CM

## 2024-09-27 DIAGNOSIS — E55.9 VITAMIN D DEFICIENCY: ICD-10-CM

## 2024-11-28 LAB
25(OH)D3+25(OH)D2 SERPL-MCNC: 53.9 NG/ML (ref 30–100)
CHOLEST SERPL-MCNC: 153 MG/DL (ref 100–199)
HBA1C MFR BLD: 6.3 % (ref 4.8–5.6)
HDLC SERPL-MCNC: 63 MG/DL
LDLC SERPL CALC-MCNC: 83 MG/DL (ref 0–99)
TRIGL SERPL-MCNC: 29 MG/DL (ref 0–149)
VLDLC SERPL CALC-MCNC: 7 MG/DL (ref 5–40)

## 2025-01-09 ENCOUNTER — TELEMEDICINE (OUTPATIENT)
Age: 65
End: 2025-01-09
Payer: COMMERCIAL

## 2025-01-09 DIAGNOSIS — E78.00 HYPERCHOLESTEROLEMIA: ICD-10-CM

## 2025-01-09 DIAGNOSIS — R35.0 FREQUENCY OF URINATION: ICD-10-CM

## 2025-01-09 DIAGNOSIS — E55.9 VITAMIN D DEFICIENCY: ICD-10-CM

## 2025-01-09 DIAGNOSIS — R73.03 BORDERLINE DIABETES MELLITUS: ICD-10-CM

## 2025-01-09 DIAGNOSIS — I10 ESSENTIAL (PRIMARY) HYPERTENSION: Primary | ICD-10-CM

## 2025-01-09 PROCEDURE — 99214 OFFICE O/P EST MOD 30 MIN: CPT | Performed by: INTERNAL MEDICINE

## 2025-01-09 SDOH — ECONOMIC STABILITY: FOOD INSECURITY: WITHIN THE PAST 12 MONTHS, YOU WORRIED THAT YOUR FOOD WOULD RUN OUT BEFORE YOU GOT MONEY TO BUY MORE.: NEVER TRUE

## 2025-01-09 SDOH — ECONOMIC STABILITY: FOOD INSECURITY: WITHIN THE PAST 12 MONTHS, THE FOOD YOU BOUGHT JUST DIDN'T LAST AND YOU DIDN'T HAVE MONEY TO GET MORE.: NEVER TRUE

## 2025-01-09 ASSESSMENT — PATIENT HEALTH QUESTIONNAIRE - PHQ9
2. FEELING DOWN, DEPRESSED OR HOPELESS: NOT AT ALL
1. LITTLE INTEREST OR PLEASURE IN DOING THINGS: NOT AT ALL
SUM OF ALL RESPONSES TO PHQ QUESTIONS 1-9: 0
SUM OF ALL RESPONSES TO PHQ9 QUESTIONS 1 & 2: 0
SUM OF ALL RESPONSES TO PHQ QUESTIONS 1-9: 0

## 2025-01-09 NOTE — PROGRESS NOTES
Jovan Gilliam, was evaluated through a synchronous (real-time) audio-video encounter. The patient (or guardian if applicable) is aware that this is a billable service, which includes applicable co-pays. This Virtual Visit was conducted with patient's (and/or legal guardian's) consent. Patient identification was verified, and a caregiver was present when appropriate.   The patient was located at Home: [address] [place] [phone]  Provider was located at Facility (Appt Dept): [address] [place] [address] [address]  [place] [address]  Confirm you are appropriately licensed, registered, or certified to deliver care in the state where the patient is located as indicated above. If you are not or unsure, please re-schedule the visit: Yes, I confirm.     Jovan Gilliam (:  1960) is a Established patient, presenting virtually for evaluation of the following:      Below is the assessment and plan developed based on review of pertinent history, physical exam, labs, studies, and medications.     Assessment & Plan  Essential (primary) hypertension  Orders:    Comprehensive Metabolic Panel; Future    Vitamin D deficiency  Orders:    Vitamin D 25 Hydroxy; Future    Hypercholesterolemia   Orders:    Lipid Panel; Future    Borderline diabetes mellitus   Orders:    Hemoglobin A1C; Future    Frequency of urination   Orders:    Urinalysis with Reflex to Culture; Future      Return in about 4 months (around 2025) for routine follow up.       Subjective   Jovan Gilliam is a 64 y.o. female  with hypertension, hypercholesterolemia is seen for follow up  Patient is doing well. She has no complaints    Review of Systems   As per hpi    Objective   Patient-Reported Vitals  No data recorded     Physical Exam  [INSTRUCTIONS:  \"[x]\" Indicates a positive item  \"[]\" Indicates a negative item  -- DELETE ALL ITEMS NOT EXAMINED]    Constitutional: [x] Appears well-developed and well-nourished [x] No apparent

## 2025-01-16 DIAGNOSIS — E78.00 HYPERCHOLESTEROLEMIA: ICD-10-CM

## 2025-01-16 DIAGNOSIS — R73.03 BORDERLINE DIABETES MELLITUS: ICD-10-CM

## 2025-01-16 DIAGNOSIS — I10 ESSENTIAL (PRIMARY) HYPERTENSION: ICD-10-CM

## 2025-01-16 DIAGNOSIS — E55.9 VITAMIN D DEFICIENCY: ICD-10-CM

## 2025-03-25 RX ORDER — AMLODIPINE BESYLATE 5 MG/1
5 TABLET ORAL DAILY
Qty: 90 TABLET | Refills: 1 | Status: SHIPPED | OUTPATIENT
Start: 2025-03-25

## 2025-03-25 RX ORDER — LOSARTAN POTASSIUM AND HYDROCHLOROTHIAZIDE 12.5; 5 MG/1; MG/1
1 TABLET ORAL DAILY
Qty: 90 TABLET | Refills: 1 | Status: SHIPPED | OUTPATIENT
Start: 2025-03-25

## 2025-05-24 LAB
25(OH)D3+25(OH)D2 SERPL-MCNC: 49.6 NG/ML (ref 30–100)
ALBUMIN SERPL-MCNC: 4.3 G/DL (ref 3.9–4.9)
ALP SERPL-CCNC: 86 IU/L (ref 44–121)
ALT SERPL-CCNC: 12 IU/L (ref 0–32)
APPEARANCE UR: CLEAR
AST SERPL-CCNC: 16 IU/L (ref 0–40)
BILIRUB SERPL-MCNC: 0.3 MG/DL (ref 0–1.2)
BILIRUB UR QL STRIP: NEGATIVE
BUN SERPL-MCNC: 14 MG/DL (ref 8–27)
BUN/CREAT SERPL: 23 (ref 12–28)
CALCIUM SERPL-MCNC: 10 MG/DL (ref 8.7–10.3)
CHLORIDE SERPL-SCNC: 105 MMOL/L (ref 96–106)
CHOLEST SERPL-MCNC: 198 MG/DL (ref 100–199)
CO2 SERPL-SCNC: 25 MMOL/L (ref 20–29)
COLOR UR: YELLOW
CREAT SERPL-MCNC: 0.61 MG/DL (ref 0.57–1)
EGFRCR SERPLBLD CKD-EPI 2021: 100 ML/MIN/1.73
GLOBULIN SER CALC-MCNC: 2.6 G/DL (ref 1.5–4.5)
GLUCOSE SERPL-MCNC: 92 MG/DL (ref 70–99)
GLUCOSE UR QL STRIP: NEGATIVE
HBA1C MFR BLD: 6.1 % (ref 4.8–5.6)
HDLC SERPL-MCNC: 67 MG/DL
HGB UR QL STRIP: NEGATIVE
KETONES UR QL STRIP: NEGATIVE
LDLC SERPL CALC-MCNC: 123 MG/DL (ref 0–99)
LEUKOCYTE ESTERASE UR QL STRIP: NEGATIVE
MICRO URNS: NORMAL
NITRITE UR QL STRIP: NEGATIVE
PH UR STRIP: 7.5 [PH] (ref 5–7.5)
POTASSIUM SERPL-SCNC: 4 MMOL/L (ref 3.5–5.2)
PROT SERPL-MCNC: 6.9 G/DL (ref 6–8.5)
PROT UR QL STRIP: NEGATIVE
SODIUM SERPL-SCNC: 144 MMOL/L (ref 134–144)
SP GR UR STRIP: 1.02 (ref 1–1.03)
TRIGL SERPL-MCNC: 42 MG/DL (ref 0–149)
URINALYSIS REFLEX: NORMAL
UROBILINOGEN UR STRIP-MCNC: 0.2 MG/DL (ref 0.2–1)
VLDLC SERPL CALC-MCNC: 8 MG/DL (ref 5–40)